# Patient Record
Sex: FEMALE | Race: WHITE | NOT HISPANIC OR LATINO | Employment: FULL TIME | URBAN - METROPOLITAN AREA
[De-identification: names, ages, dates, MRNs, and addresses within clinical notes are randomized per-mention and may not be internally consistent; named-entity substitution may affect disease eponyms.]

---

## 2020-01-06 PROBLEM — G70.00 MYASTHENIA GRAVIS: Status: ACTIVE | Noted: 2020-01-06

## 2020-05-15 PROBLEM — F31.9 BIPOLAR DISORDER: Status: ACTIVE | Noted: 2020-05-15

## 2020-05-22 ENCOUNTER — TELEPHONE (OUTPATIENT)
Dept: NEUROLOGY | Facility: CLINIC | Age: 35
End: 2020-05-22

## 2020-05-22 NOTE — TELEPHONE ENCOUNTER
----- Message from Nely Cifuentes sent at 5/22/2020 11:45 AM CDT -----  Contact: self @ [phone]  Calling to schedule a NP appt for Myasthenia Gravis but there is nothing available.  Pls call.

## 2024-05-01 ENCOUNTER — LAB REQUISITION (OUTPATIENT)
Dept: LAB | Facility: HOSPITAL | Age: 39
End: 2024-05-01
Payer: COMMERCIAL

## 2024-05-01 DIAGNOSIS — R11.2 NAUSEA WITH VOMITING, UNSPECIFIED: ICD-10-CM

## 2024-05-01 PROCEDURE — 87086 URINE CULTURE/COLONY COUNT: CPT

## 2024-05-03 LAB — BACTERIA UR CULT: NORMAL

## 2024-05-17 ENCOUNTER — OFFICE VISIT (OUTPATIENT)
Dept: PRIMARY CARE | Facility: CLINIC | Age: 39
End: 2024-05-17
Payer: COMMERCIAL

## 2024-05-17 ENCOUNTER — LAB (OUTPATIENT)
Dept: LAB | Facility: LAB | Age: 39
End: 2024-05-17
Payer: COMMERCIAL

## 2024-05-17 VITALS
WEIGHT: 128.4 LBS | RESPIRATION RATE: 16 BRPM | HEIGHT: 65 IN | DIASTOLIC BLOOD PRESSURE: 84 MMHG | SYSTOLIC BLOOD PRESSURE: 120 MMHG | BODY MASS INDEX: 21.39 KG/M2 | HEART RATE: 80 BPM

## 2024-05-17 DIAGNOSIS — Z00.00 ROUTINE ADULT HEALTH MAINTENANCE: ICD-10-CM

## 2024-05-17 DIAGNOSIS — R63.4 WEIGHT LOSS: ICD-10-CM

## 2024-05-17 DIAGNOSIS — Z51.81 MEDICATION MONITORING ENCOUNTER: ICD-10-CM

## 2024-05-17 DIAGNOSIS — G70.00 MYASTHENIA GRAVIS (MULTI): ICD-10-CM

## 2024-05-17 DIAGNOSIS — K21.9 GASTROESOPHAGEAL REFLUX DISEASE, UNSPECIFIED WHETHER ESOPHAGITIS PRESENT: ICD-10-CM

## 2024-05-17 DIAGNOSIS — E55.9 VITAMIN D DEFICIENCY: ICD-10-CM

## 2024-05-17 DIAGNOSIS — F31.9 BIPOLAR AFFECTIVE DISORDER, REMISSION STATUS UNSPECIFIED (MULTI): Primary | ICD-10-CM

## 2024-05-17 DIAGNOSIS — H54.7 VISION LOSS: ICD-10-CM

## 2024-05-17 DIAGNOSIS — F51.01 PRIMARY INSOMNIA: ICD-10-CM

## 2024-05-17 LAB
25(OH)D3 SERPL-MCNC: 27 NG/ML (ref 30–100)
ALBUMIN SERPL BCP-MCNC: 4.7 G/DL (ref 3.4–5)
ALP SERPL-CCNC: 58 U/L (ref 33–110)
ALT SERPL W P-5'-P-CCNC: 23 U/L (ref 7–45)
ANION GAP SERPL CALC-SCNC: 14 MMOL/L (ref 10–20)
AST SERPL W P-5'-P-CCNC: 25 U/L (ref 9–39)
BASOPHILS # BLD AUTO: 0.06 X10*3/UL (ref 0–0.1)
BASOPHILS NFR BLD AUTO: 0.8 %
BILIRUB SERPL-MCNC: 0.4 MG/DL (ref 0–1.2)
BUN SERPL-MCNC: 11 MG/DL (ref 6–23)
CALCIUM SERPL-MCNC: 10 MG/DL (ref 8.6–10.6)
CHLORIDE SERPL-SCNC: 98 MMOL/L (ref 98–107)
CHOLEST SERPL-MCNC: 256 MG/DL (ref 0–199)
CHOLESTEROL/HDL RATIO: 2.9
CO2 SERPL-SCNC: 29 MMOL/L (ref 21–32)
CREAT SERPL-MCNC: 0.77 MG/DL (ref 0.5–1.05)
EGFRCR SERPLBLD CKD-EPI 2021: >90 ML/MIN/1.73M*2
EOSINOPHIL # BLD AUTO: 0.06 X10*3/UL (ref 0–0.7)
EOSINOPHIL NFR BLD AUTO: 0.8 %
ERYTHROCYTE [DISTWIDTH] IN BLOOD BY AUTOMATED COUNT: 12.6 % (ref 11.5–14.5)
GLUCOSE SERPL-MCNC: 99 MG/DL (ref 74–99)
HCT VFR BLD AUTO: 38.3 % (ref 36–46)
HDLC SERPL-MCNC: 87.1 MG/DL
HGB BLD-MCNC: 12.3 G/DL (ref 12–16)
IMM GRANULOCYTES # BLD AUTO: 0.02 X10*3/UL (ref 0–0.7)
IMM GRANULOCYTES NFR BLD AUTO: 0.3 % (ref 0–0.9)
LDLC SERPL CALC-MCNC: 155 MG/DL
LYMPHOCYTES # BLD AUTO: 1.74 X10*3/UL (ref 1.2–4.8)
LYMPHOCYTES NFR BLD AUTO: 24.3 %
MCH RBC QN AUTO: 31.2 PG (ref 26–34)
MCHC RBC AUTO-ENTMCNC: 32.1 G/DL (ref 32–36)
MCV RBC AUTO: 97 FL (ref 80–100)
MONOCYTES # BLD AUTO: 0.9 X10*3/UL (ref 0.1–1)
MONOCYTES NFR BLD AUTO: 12.6 %
NEUTROPHILS # BLD AUTO: 4.38 X10*3/UL (ref 1.2–7.7)
NEUTROPHILS NFR BLD AUTO: 61.2 %
NON HDL CHOLESTEROL: 169 MG/DL (ref 0–149)
NRBC BLD-RTO: 0 /100 WBCS (ref 0–0)
PLATELET # BLD AUTO: 273 X10*3/UL (ref 150–450)
POTASSIUM SERPL-SCNC: 4.9 MMOL/L (ref 3.5–5.3)
PROT SERPL-MCNC: 7.5 G/DL (ref 6.4–8.2)
RBC # BLD AUTO: 3.94 X10*6/UL (ref 4–5.2)
SODIUM SERPL-SCNC: 136 MMOL/L (ref 136–145)
TRIGL SERPL-MCNC: 72 MG/DL (ref 0–149)
TSH SERPL-ACNC: 2.19 MIU/L (ref 0.44–3.98)
VLDL: 14 MG/DL (ref 0–40)
WBC # BLD AUTO: 7.2 X10*3/UL (ref 4.4–11.3)

## 2024-05-17 PROCEDURE — 85025 COMPLETE CBC W/AUTO DIFF WBC: CPT

## 2024-05-17 PROCEDURE — 82306 VITAMIN D 25 HYDROXY: CPT

## 2024-05-17 PROCEDURE — 36415 COLL VENOUS BLD VENIPUNCTURE: CPT

## 2024-05-17 PROCEDURE — 80053 COMPREHEN METABOLIC PANEL: CPT

## 2024-05-17 PROCEDURE — 99205 OFFICE O/P NEW HI 60 MIN: CPT | Performed by: INTERNAL MEDICINE

## 2024-05-17 PROCEDURE — 84443 ASSAY THYROID STIM HORMONE: CPT

## 2024-05-17 PROCEDURE — 80061 LIPID PANEL: CPT

## 2024-05-17 RX ORDER — PYRIDOSTIGMINE BROMIDE 60 MG/1
60 TABLET ORAL
COMMUNITY

## 2024-05-17 RX ORDER — ZOLPIDEM TARTRATE 10 MG/1
5 TABLET ORAL NIGHTLY PRN
COMMUNITY
End: 2024-05-23 | Stop reason: ALTCHOICE

## 2024-05-17 RX ORDER — CARBAMAZEPINE 200 MG/1
200 TABLET, EXTENDED RELEASE ORAL 2 TIMES DAILY
COMMUNITY

## 2024-05-17 RX ORDER — FOLIC ACID 1 MG/1
TABLET ORAL DAILY
COMMUNITY

## 2024-05-17 RX ORDER — FAMOTIDINE 20 MG/1
20 TABLET, FILM COATED ORAL 2 TIMES DAILY
Qty: 60 TABLET | Refills: 2 | Status: SHIPPED | OUTPATIENT
Start: 2024-05-17

## 2024-05-17 RX ORDER — METHOTREXATE 2.5 MG/1
TABLET ORAL
COMMUNITY

## 2024-05-17 RX ORDER — TRAZODONE HYDROCHLORIDE 50 MG/1
50 TABLET ORAL NIGHTLY PRN
Qty: 30 TABLET | Refills: 2 | Status: SHIPPED | OUTPATIENT
Start: 2024-05-17 | End: 2025-05-17

## 2024-05-17 ASSESSMENT — PATIENT HEALTH QUESTIONNAIRE - PHQ9
2. FEELING DOWN, DEPRESSED OR HOPELESS: SEVERAL DAYS
SUM OF ALL RESPONSES TO PHQ9 QUESTIONS 1 AND 2: 2
1. LITTLE INTEREST OR PLEASURE IN DOING THINGS: SEVERAL DAYS

## 2024-05-17 ASSESSMENT — PAIN SCALES - GENERAL: PAINLEVEL: 0-NO PAIN

## 2024-05-17 NOTE — PROGRESS NOTES
"Subjective   Azeb Amato is a 39 y.o. female who presents for Establish Care (Discuss referrals for Neurologist and Therapist.).  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 [place] zolpidem: has 10 mg ant takes 5 mg     Seh goes back to [place] every 5 mo to see her neurologist    Former smoker : began at 13 y/o to 2013. Was less than pack a day  She will have some wine on the weekends    Review of Systems   HENT:  Negative for hearing loss.    Respiratory:  Negative for shortness of breath.    Cardiovascular:  Negative for chest pain.   Gastrointestinal:  Positive for abdominal pain. Negative for constipation and diarrhea.        She has been having more dyspepsia and reflux.   She has also been having pelvic pain; she thinks it may related to her IUD.        Objective   /84   Pulse 80   Resp 16   Ht 1.651 m (5' 5\")   Wt 58.2 kg (128 lb 6.4 oz)   BMI 21.37 kg/m²    Physical Exam  Constitutional:       Appearance: Normal appearance.   HENT:      Right Ear: Tympanic membrane, ear canal and external ear normal.      Left Ear: Tympanic membrane, ear canal and external ear normal.      Mouth/Throat:      Mouth: Mucous membranes are moist.      Pharynx: Oropharynx is clear.   Eyes:      Conjunctiva/sclera: Conjunctivae normal.      Pupils: " "Pupils are equal, round, and reactive to light.      Comments: Strabisumus noted.    Cardiovascular:      Rate and Rhythm: Normal rate and regular rhythm.   Pulmonary:      Effort: Pulmonary effort is normal.      Breath sounds: Normal breath sounds.   Abdominal:      General: Bowel sounds are normal. There is no distension.      Palpations: Abdomen is soft. There is no mass.      Tenderness: There is no abdominal tenderness.   Lymphadenopathy:      Cervical: No cervical adenopathy.   Skin:     General: Skin is warm and dry.   Neurological:      General: No focal deficit present.      Mental Status: Mental status is at baseline.         Assessment/Plan   Problem List Items Addressed This Visit    None    1.myasthenia gravis:  refer to neurology and also ophthlamology.  She is staking pyrodistigmine  and methotrexate.   2. Bipolar disorder: currently she is on carbamazepine. She expresses concern over her moods right now, especially since she's having trouble sleeping.  I referred her to psych through . Prescribed trazodone for her , 50 to 100 mg at night. She has been taking a zolpiem from [place] as needed.   3. Inosominia related to bipolar disorder: rx trazodone.   4. GERD/dyspepsia\" rx for famotidine 20 mg twice daily.   5. Pelvic pain: rx for ibuprofen to use sparingly , as can have some interaction with Methotrexate  6. Weight loss:  will check cmp, cbc, tsh  7. Visual loss: she has been found to have decreased vision due to strabismus from myasthnia gravis. Refer to ophtho.   See me in 6 to  8 weeks  Check labs       "

## 2024-05-17 NOTE — PATIENT INSTRUCTIONS
It was nice to meet you today.    Take pepcid 20 mg one twice a day as needed for heartburn/stomach upset.     Trazodone for sleep 50 mg at night, once you are relaxed.   Can try taking one half at night  at first.   NO electronics at bedtime.     Get blood test done after fasting overnight.  The  lab is on the first floor.  Lab hours are 7 am to 4 pm Mon-Fri and 8am to Noon on Saturday.  No appt is needed.  Orders are entered into the system so you do not need a paper order.     See me again in 6 to 8 weeks for 30 min visit.

## 2024-05-22 ASSESSMENT — ENCOUNTER SYMPTOMS
ABDOMINAL PAIN: 1
FREQUENCY: 1
CONSTIPATION: 1
BELCHING: 1
WEIGHT LOSS: 1
ANOREXIA: 1
NAUSEA: 1

## 2024-05-23 ENCOUNTER — OFFICE VISIT (OUTPATIENT)
Dept: PRIMARY CARE | Facility: CLINIC | Age: 39
End: 2024-05-23
Payer: COMMERCIAL

## 2024-05-23 VITALS
HEIGHT: 65 IN | RESPIRATION RATE: 16 BRPM | BODY MASS INDEX: 21.56 KG/M2 | WEIGHT: 129.4 LBS | SYSTOLIC BLOOD PRESSURE: 128 MMHG | DIASTOLIC BLOOD PRESSURE: 80 MMHG | HEART RATE: 78 BPM

## 2024-05-23 DIAGNOSIS — R10.2 PELVIC PAIN: Primary | ICD-10-CM

## 2024-05-23 DIAGNOSIS — F51.01 PRIMARY INSOMNIA: ICD-10-CM

## 2024-05-23 PROCEDURE — 99214 OFFICE O/P EST MOD 30 MIN: CPT | Performed by: INTERNAL MEDICINE

## 2024-05-23 RX ORDER — IBUPROFEN 600 MG/1
600 TABLET ORAL 4 TIMES DAILY PRN
Qty: 60 TABLET | Refills: 1 | Status: SHIPPED | OUTPATIENT
Start: 2024-05-23

## 2024-05-23 RX ORDER — ZOLPIDEM TARTRATE 5 MG/1
5 TABLET ORAL NIGHTLY PRN
Qty: 30 TABLET | Refills: 0 | Status: SHIPPED | OUTPATIENT
Start: 2024-05-23

## 2024-05-23 ASSESSMENT — PATIENT HEALTH QUESTIONNAIRE - PHQ9
2. FEELING DOWN, DEPRESSED OR HOPELESS: NOT AT ALL
1. LITTLE INTEREST OR PLEASURE IN DOING THINGS: NOT AT ALL
SUM OF ALL RESPONSES TO PHQ9 QUESTIONS 1 AND 2: 0

## 2024-05-23 ASSESSMENT — PAIN SCALES - GENERAL: PAINLEVEL: 0-NO PAIN

## 2024-05-23 NOTE — PATIENT INSTRUCTIONS
I sent over ibuprofen ; see how you do with taking it with food.   Is not for every single day, due to your methotrexate, but it can help.     I sent in for zolpidem ( sleeping pill ) .  Is 5 mg so you don't need to split anything.   Get rid of trazodone.     I recommend vitamin D 3 2,000 units once a day as your vitamin D was low.     Keep your next appt with me.

## 2024-05-23 NOTE — PROGRESS NOTES
Subjective   Azeb Amato is a 39 y.o. female who presents for Follow-up (Pt here for ER follow up for nausea and vomiting, possibly due to IUD.).    She went to ER twice  She thought she had a panic attack that night  Had chest pain    She had not been having usual bm for a few days  Seh has miralax    On Monday she had woke up with vomiting and lower abd pain   She said she was passing out from pain   She had much looser stool with some cramping  She has an IUD and wonders if this could be causing her abdominal pain  Her urinalysis was negative  She said she was referrred to gyn and she has an appt scheduled for a [facility] gyn    She has been having more problems with sleeping  She does not want to continue on trazodone as she had really vivid dreams   She endorses that zolpidem has worked consistently for her    OARRS:  Tashia Nobles, DO on 5/23/2024  4:04 PM  I have personally reviewed the OARRS report for Azeb Amato. I have considered the risks of abuse, dependence, addiction and diversion and I believe that it is clinically appropriate for Azeb Amato to be prescribed this medication    Is the patient prescribed a combination of a benzodiazepine and opioid?  No    Last Urine Drug Screen / ordered today: No  No results found for this or any previous visit (from the past 8760 hour(s)).  N/A    Clinical rationale for not completing a Urine Drug Screen: will order at next visit      Controlled Substance Agreement:  Date of the Last Agreement: 5/23/2024  Reviewed Controlled Substance Agreement including but not limited to the benefits, risks, and alternatives to treatment with a Controlled Substance medication(s).    Sleep Aids:   What is the patient's goal of therapy? Treatment of insomnia  Is this being achieved with current treatment? Yes.     Activities of Daily Living:   Is your overall impression that this patient is benefiting (symptom reduction outweighs side effects) from sleep aid therapy? I believe she  "will benefit from it. She is also diagnosed with bipolar disorder and is having more difficulty with sleep . She is scheduled to see psychiatry .   She does not have any personal history of substance abuse.       Review of Systems    Objective   /80   Pulse 78   Resp 16   Ht 1.651 m (5' 5\")   Wt 58.7 kg (129 lb 6.4 oz)   BMI 21.53 kg/m²    Physical Exam  Visit Vitals  /80   Pulse 78   Resp 16   Ht 1.651 m (5' 5\")   Wt 58.7 kg (129 lb 6.4 oz)   BMI 21.53 kg/m²   Smoking Status Former   BSA 1.64 m²      GEN: NAD  HEENT: normal  NECK: no adenopathy, no thyroid enlargment  LUNGS: CTAB  CV: reg S1/S2 no murmurs  ABD: has some lower abdominal discomfort to palpation, no mass or organomegaly  EXT: no leg edema   Assessment/Plan   Problem List Items Addressed This Visit    None  Visit Diagnoses       Pelvic pain    -  RX for ibuprofen 600 up to 4 times a day as needed for her abdominal /pelvic cramping. I feel it may be related to her IUD as well. She is going to see OB.     Relevant Medications    ibuprofen 600 mg tablet    Primary insomnia    controlled substance agreement completed today. Will order urine drug screening if she continues on zolpidem. Rx sent for #30 tabs / NR.    Relevant Medications    zolpidem (Ambien) 5 mg tablet               "

## 2024-06-02 ASSESSMENT — ENCOUNTER SYMPTOMS
CONSTIPATION: 0
ABDOMINAL PAIN: 1
DIARRHEA: 0
SHORTNESS OF BREATH: 0

## 2024-06-28 ENCOUNTER — APPOINTMENT (OUTPATIENT)
Dept: BEHAVIORAL HEALTH | Facility: CLINIC | Age: 39
End: 2024-06-28
Payer: COMMERCIAL

## 2024-07-02 ENCOUNTER — APPOINTMENT (OUTPATIENT)
Dept: PRIMARY CARE | Facility: CLINIC | Age: 39
End: 2024-07-02
Payer: COMMERCIAL

## 2024-07-02 VITALS
HEIGHT: 65 IN | BODY MASS INDEX: 22.13 KG/M2 | SYSTOLIC BLOOD PRESSURE: 132 MMHG | HEART RATE: 86 BPM | RESPIRATION RATE: 16 BRPM | DIASTOLIC BLOOD PRESSURE: 69 MMHG | WEIGHT: 132.8 LBS

## 2024-07-02 DIAGNOSIS — R11.2 NAUSEA AND VOMITING, UNSPECIFIED VOMITING TYPE: Primary | ICD-10-CM

## 2024-07-02 DIAGNOSIS — F31.9 BIPOLAR AFFECTIVE DISORDER, REMISSION STATUS UNSPECIFIED (MULTI): ICD-10-CM

## 2024-07-02 DIAGNOSIS — G70.00 MYASTHENIA GRAVIS (MULTI): ICD-10-CM

## 2024-07-02 PROCEDURE — 1036F TOBACCO NON-USER: CPT | Performed by: INTERNAL MEDICINE

## 2024-07-02 PROCEDURE — 99214 OFFICE O/P EST MOD 30 MIN: CPT | Performed by: INTERNAL MEDICINE

## 2024-07-02 RX ORDER — ONDANSETRON 4 MG/1
4 TABLET, ORALLY DISINTEGRATING ORAL EVERY 8 HOURS PRN
Qty: 30 TABLET | Refills: 2 | Status: SHIPPED | OUTPATIENT
Start: 2024-07-02

## 2024-07-02 ASSESSMENT — PAIN SCALES - GENERAL: PAINLEVEL: 0-NO PAIN

## 2024-07-02 ASSESSMENT — PATIENT HEALTH QUESTIONNAIRE - PHQ9
2. FEELING DOWN, DEPRESSED OR HOPELESS: NOT AT ALL
SUM OF ALL RESPONSES TO PHQ9 QUESTIONS 1 AND 2: 0
1. LITTLE INTEREST OR PLEASURE IN DOING THINGS: NOT AT ALL

## 2024-07-02 NOTE — PROGRESS NOTES
"Subjective   Azeb Amato is a 39 y.o. female who presents for Follow-up.    She saw Dr Corwin Retana from [facility] and she liked him a lot.     She is filing for divorce  Her 's behavior is worsening; he is using THC every day and he drinks excessively on weekend  He has been acting in a threatening way towards her, he is very explosive, calling her names  She is becoming more concerned because her daughter is hearing all of this, and her daughter has been quite distressed at this lately. She does have a plan of where to go.     She is seeing psych at [facility]  She is taking olanzapine 15 [facility] at [facility]  Her sleep is better, is not getting up for anything   She is working at home at night once she goes to sleep     She heas severe nausea in the morning  Some of it is from carbamazepine that she takes first thing in the morning    She did see physical therapy for her lower back pain and pelvic pain  She mentions that some of her urinary urgency and frequency is from drinking too much fluids  She says that she does not have time to stop and eat at work , so she has been using shakes, etc for liquid calories    She has a meeting at her work to discuss modifications for her, with an industrial psychologist.     She is still working on her [place] Citizenship, thinks she will need an  with the divorce.   Review of Systems    Objective   /69   Pulse 86   Resp 16   Ht 1.651 m (5' 5\")   Wt 60.2 kg (132 lb 12.8 oz)   BMI 22.10 kg/m²    Physical Exam  Visit Vitals  /69   Pulse 86   Resp 16   Ht 1.651 m (5' 5\")   Wt 60.2 kg (132 lb 12.8 oz)   BMI 22.10 kg/m²   Smoking Status Former   BSA 1.66 m²      GEN: NAD  HEENT: normal  NECK: no adenopathy, no thyroid enlargment  LUNGS: CTAB  CV: reg S1/S2 no murmurs  EXT: no leg edema   Assessment/Plan   Problem List Items Addressed This Visit    None  Visit Diagnoses       Nausea and vomiting:  likely related to anxiety and med side effects. " Trial zofran ODT, recommend one every morning with onset nausea.    Relevant Medications    ondansetron ODT (Zofran-ODT) 4 mg disintegrating tablet. RX #30 tabs  2 RF    Bipolar affective disorder, remission status unspecified (Multi)    it seems she is coping with everything as well as she could. She is taking carbamazepine , and olanzapine 50 at night.        Myasthenia gravis (Multi)    liked [facility] neuro.  She is still going back to [place] to see her primary neurologist. She is on methotrexate, pyridostigmine and folate.     See me again in 3 months

## 2024-08-16 ENCOUNTER — APPOINTMENT (OUTPATIENT)
Dept: BEHAVIORAL HEALTH | Facility: CLINIC | Age: 39
End: 2024-08-16
Payer: COMMERCIAL

## 2024-09-26 ENCOUNTER — APPOINTMENT (OUTPATIENT)
Dept: OPHTHALMOLOGY | Facility: CLINIC | Age: 39
End: 2024-09-26
Payer: COMMERCIAL

## 2024-09-27 ENCOUNTER — TELEPHONE (OUTPATIENT)
Dept: PRIMARY CARE | Facility: CLINIC | Age: 39
End: 2024-09-27
Payer: COMMERCIAL

## 2024-09-27 DIAGNOSIS — J20.8 ACUTE BRONCHITIS DUE TO OTHER SPECIFIED ORGANISMS: Primary | ICD-10-CM

## 2024-09-27 RX ORDER — DOXYCYCLINE 100 MG/1
100 CAPSULE ORAL 2 TIMES DAILY
Qty: 14 CAPSULE | Refills: 0 | Status: SHIPPED | OUTPATIENT
Start: 2024-09-27 | End: 2024-10-04

## 2024-09-27 NOTE — TELEPHONE ENCOUNTER
Patient called stating she think she has a cold and it feels like it is in her chest.  Patient want a call from the nurse    Azeb [phone]

## 2024-10-14 ENCOUNTER — APPOINTMENT (OUTPATIENT)
Dept: PRIMARY CARE | Facility: CLINIC | Age: 39
End: 2024-10-14
Payer: COMMERCIAL

## 2024-10-14 VITALS
HEART RATE: 86 BPM | BODY MASS INDEX: 22.82 KG/M2 | RESPIRATION RATE: 16 BRPM | DIASTOLIC BLOOD PRESSURE: 69 MMHG | SYSTOLIC BLOOD PRESSURE: 105 MMHG | WEIGHT: 137 LBS | HEIGHT: 65 IN

## 2024-10-14 DIAGNOSIS — E55.9 VITAMIN D DEFICIENCY: ICD-10-CM

## 2024-10-14 DIAGNOSIS — F51.05 INSOMNIA DUE TO OTHER MENTAL DISORDER: ICD-10-CM

## 2024-10-14 DIAGNOSIS — Z97.5 BREAKTHROUGH BLEEDING ASSOCIATED WITH INTRAUTERINE DEVICE (IUD): Primary | ICD-10-CM

## 2024-10-14 DIAGNOSIS — G70.00 MYASTHENIA GRAVIS: ICD-10-CM

## 2024-10-14 DIAGNOSIS — Z51.81 MEDICATION MONITORING ENCOUNTER: ICD-10-CM

## 2024-10-14 DIAGNOSIS — F99 INSOMNIA DUE TO OTHER MENTAL DISORDER: ICD-10-CM

## 2024-10-14 DIAGNOSIS — Z00.00 ROUTINE ADULT HEALTH MAINTENANCE: ICD-10-CM

## 2024-10-14 DIAGNOSIS — F31.9 BIPOLAR 1 DISORDER (MULTI): ICD-10-CM

## 2024-10-14 DIAGNOSIS — R73.09 ELEVATED GLUCOSE: ICD-10-CM

## 2024-10-14 DIAGNOSIS — N92.1 BREAKTHROUGH BLEEDING ASSOCIATED WITH INTRAUTERINE DEVICE (IUD): Primary | ICD-10-CM

## 2024-10-14 PROCEDURE — 3008F BODY MASS INDEX DOCD: CPT | Performed by: INTERNAL MEDICINE

## 2024-10-14 PROCEDURE — 99214 OFFICE O/P EST MOD 30 MIN: CPT | Performed by: INTERNAL MEDICINE

## 2024-10-14 RX ORDER — METHOTREXATE 2.5 MG/1
15 TABLET ORAL
Qty: 15 TABLET | Refills: 0 | Status: SHIPPED | OUTPATIENT
Start: 2024-10-14

## 2024-10-14 RX ORDER — CARBAMAZEPINE 200 MG/1
400 TABLET, EXTENDED RELEASE ORAL 2 TIMES DAILY
Qty: 120 TABLET | Refills: 0 | Status: SHIPPED | OUTPATIENT
Start: 2024-10-14

## 2024-10-14 ASSESSMENT — PAIN SCALES - GENERAL: PAINLEVEL: 0-NO PAIN

## 2024-10-14 NOTE — PATIENT INSTRUCTIONS
I recommend a daily B Complex vitamin daily.  Remain on vitamin D.    Schedule with Dr Goncalves / [facility] for gyn.    See me again in 6 months for 30 min visit .    Close to next visit : Get blood test done after fasting overnight.  The  lab is on the first floor.  Lab hours are 7 am to 4 pm Mon-Fri and 8am to Noon on Saturday.  No appt is needed.  Orders are entered into the system so you do not need a paper order.

## 2024-10-14 NOTE — PROGRESS NOTES
Subjective   Patient ID: Azeb Amato is a 39 y.o. female who presents for Follow-up (Pt here for 3 month follow up.).    HPI   Doing much better than 3 mo ago   She and her daughter are safe in their own apartment  She says that her soon to be ex seems to have stopped drinking for the time being  Work is calmed down as well    She has been sleeping better  She has been having much less frequent panic attacks    She is seeing chiro for her back which helps, sees twice a week     She recovered from her bronchitis , the med I called in helped her   She is taking methotrexate 15 mg on Fri: still feels a bit ill after it but is recoveing ok  Starts in afternoon on Monday  She is hoping to be able to stop methotrexate if possible.     She is taking mestionon 6 times a day     She is on olanzapine 10 mg , it helps with her   She just started Topamax to help counteract the increased appetitive with olnazapine    She is spotting with IUD and has been having some cramping  She had never had spotting before  Got IUD in 2022     She is taking 2000 units vit D daily   Review of Systems      Current Outpatient Medications:     carBAMazepine XR (TEGretol  XR) 200 mg 12 hr tablet, Take 1 tablet (200 mg) by mouth 2 times a day. Do not crush, chew, or split., Disp: , Rfl:     famotidine (Pepcid) 20 mg tablet, Take 1 tablet (20 mg) by mouth 2 times a day. For Acid reflux/heartburn., Disp: 60 tablet, Rfl: 2    folic acid (Folvite) 1 mg tablet, Take by mouth once daily., Disp: , Rfl:     levonorgestrel (Mirena) 21 mcg/24 hours (8 yrs) 52 mg IUD, 52 mg by intrauterine route 1 time., Disp: , Rfl:     methotrexate (Trexall) 2.5 mg tablet, Take by mouth 1 (one) time per week.  Follow directions carefully, and ask to explain any part you do not understand. Take exactly as directed., Disp: , Rfl:     ondansetron ODT (Zofran-ODT) 4 mg disintegrating tablet, Take 1 tablet (4 mg) by mouth every 8 hours if needed for nausea or vomiting., Disp:  "30 tablet, Rfl: 2    pyridostigmine (Mestinon) 60 mg tablet, Take 1 tablet (60 mg) by mouth 6 times a day., Disp: , Rfl:     traZODone (Desyrel) 50 mg tablet, Take 1 tablet (50 mg) by mouth as needed at bedtime for sleep., Disp: 30 tablet, Rfl: 2    Objective   /69   Pulse 86   Resp 16   Ht 1.651 m (5' 5\")   Wt 62.1 kg (137 lb)   BMI 22.80 kg/m²     Physical Exam  Constitutional:       Appearance: Normal appearance.   HENT:      Mouth/Throat:      Mouth: Mucous membranes are moist.      Pharynx: Oropharynx is clear.   Eyes:      Conjunctiva/sclera: Conjunctivae normal.      Pupils: Pupils are equal, round, and reactive to light.   Cardiovascular:      Rate and Rhythm: Normal rate and regular rhythm.      Heart sounds: Normal heart sounds.   Pulmonary:      Effort: Pulmonary effort is normal.      Breath sounds: Normal breath sounds.   Abdominal:      General: Bowel sounds are normal. There is no distension.      Palpations: Abdomen is soft. There is no mass.      Tenderness: There is no abdominal tenderness.   Lymphadenopathy:      Cervical: No cervical adenopathy.   Skin:     General: Skin is warm and dry.   Neurological:      General: No focal deficit present.         Assessment/Plan   Problem List Items Addressed This Visit       Bipolar 1 disorder (Multi) remain on carbamazepine 400 twice a day, and also olanzapine 10 at night. Since things have settled down with her , she is doing better mentally.     Myasthenia gravis remain on pyridostigmine and also methotrexate. Follows with neuro.     Relevant Orders    TSH with reflex to Free T4 if abnormal     Other Visit Diagnoses       Breakthrough bleeding associated with intrauterine device (IUD)    -  Primary    Relevant Orders    Referral to Gynecology    Medication monitoring encounter        Relevant Orders    Comprehensive Metabolic Panel    TSH with reflex to Free T4 if abnormal    CBC and Auto Differential    Elevated glucose        Relevant Orders "    Comprehensive Metabolic Panel    TSH with reflex to Free T4 if abnormal    Hemoglobin A1C

## 2024-10-17 ENCOUNTER — APPOINTMENT (OUTPATIENT)
Dept: OPHTHALMOLOGY | Facility: CLINIC | Age: 39
End: 2024-10-17
Payer: COMMERCIAL

## 2024-10-17 DIAGNOSIS — H17.9 RIGHT CORNEAL SCAR: Primary | ICD-10-CM

## 2024-10-17 DIAGNOSIS — Z01.00 EMMETROPIA: ICD-10-CM

## 2024-10-17 DIAGNOSIS — H54.7 VISION LOSS: ICD-10-CM

## 2024-10-17 PROCEDURE — 92004 COMPRE OPH EXAM NEW PT 1/>: CPT | Performed by: OPTOMETRIST

## 2024-10-17 PROCEDURE — 92025 CPTRIZED CORNEAL TOPOGRAPHY: CPT | Performed by: OPTOMETRIST

## 2024-10-17 ASSESSMENT — REFRACTION_MANIFEST
OS_SPHERE: +0.00
OD_SPHERE: BALANCE

## 2024-10-17 ASSESSMENT — CONF VISUAL FIELD
OS_SUPERIOR_TEMPORAL_RESTRICTION: 0
OD_SUPERIOR_TEMPORAL_RESTRICTION: 0
OD_NORMAL: 1
OD_SUPERIOR_NASAL_RESTRICTION: 0
OS_SUPERIOR_NASAL_RESTRICTION: 0
OD_INFERIOR_NASAL_RESTRICTION: 0
OD_INFERIOR_TEMPORAL_RESTRICTION: 0
OS_INFERIOR_TEMPORAL_RESTRICTION: 0
OS_INFERIOR_NASAL_RESTRICTION: 0
OS_NORMAL: 1

## 2024-10-17 ASSESSMENT — EXTERNAL EXAM - RIGHT EYE: OD_EXAM: NORMAL

## 2024-10-17 ASSESSMENT — VISUAL ACUITY
OS_SC: 20/20
METHOD: SNELLEN - LINEAR
OD_SC: HM

## 2024-10-17 ASSESSMENT — ENCOUNTER SYMPTOMS: EYES NEGATIVE: 1

## 2024-10-17 ASSESSMENT — SLIT LAMP EXAM - LIDS
COMMENTS: NORMAL
COMMENTS: NORMAL

## 2024-10-17 ASSESSMENT — TONOMETRY
IOP_METHOD: GOLDMANN APPLANATION
OD_IOP_MMHG: 14
OS_IOP_MMHG: 14

## 2024-10-17 ASSESSMENT — EXTERNAL EXAM - LEFT EYE: OS_EXAM: NORMAL

## 2024-10-17 NOTE — PROGRESS NOTES
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 Was offered penetrating keratoplasty (PKP). Was seen for this elsewhere and is interested in proceeding.     Has photophobia and reflex tearing stable and has managed. Has not has any keratitis flares. Has not had valcyclovir or acyclovir maintenance therapy after initial therapy for acute phase. No drops for this.     Does not have safety glasses. Has intermittent sensory XT. Patient feels this is visible and annoying.     The scar appears too dense for an rigid gas permeable (RGP) to help adequately with vision.     Pentacam topographical analysis of the cornea revealed:  OD:simulated keratometric values  irregular no reading  Kmax: 144.8  thinnest corneal pachymetry value no reading micron  posterior float deviation from normal no view micron  OS:simulated keratometric values  43.2/44.0  Kmax: 44.4  thinnest corneal pachymetry value 510 micron  posterior float deviation from normal  +4 micron  These values are indicative of corneal irregularity OD and likely contribute to reduced visual acuity.     Referred for penetrating keratoplasty (PKP) consideration to Dr Delbert Zaidi.

## 2024-10-27 RX ORDER — OLANZAPINE 10 MG/1
10 TABLET ORAL NIGHTLY
COMMUNITY
Start: 2024-10-06

## 2024-11-21 ENCOUNTER — APPOINTMENT (OUTPATIENT)
Dept: OPHTHALMOLOGY | Age: 39
End: 2024-11-21
Payer: COMMERCIAL

## 2024-11-25 ENCOUNTER — APPOINTMENT (OUTPATIENT)
Dept: OPHTHALMOLOGY | Age: 39
End: 2024-11-25
Payer: COMMERCIAL

## 2024-12-02 ENCOUNTER — APPOINTMENT (OUTPATIENT)
Dept: OPHTHALMOLOGY | Age: 39
End: 2024-12-02
Payer: COMMERCIAL

## 2024-12-02 ENCOUNTER — PREP FOR PROCEDURE (OUTPATIENT)
Dept: OPHTHALMOLOGY | Facility: HOSPITAL | Age: 39
End: 2024-12-02

## 2024-12-02 DIAGNOSIS — H17.9 CORNEAL SCAR AND OPACITY: Primary | ICD-10-CM

## 2024-12-02 DIAGNOSIS — H17.9 CORNEAL SCARRING: Primary | ICD-10-CM

## 2024-12-02 PROCEDURE — 99213 OFFICE O/P EST LOW 20 MIN: CPT | Performed by: OPHTHALMOLOGY

## 2024-12-02 RX ORDER — MOXIFLOXACIN 5 MG/ML
1 SOLUTION/ DROPS OPHTHALMIC 3 TIMES DAILY
OUTPATIENT
Start: 2024-12-02

## 2024-12-02 RX ORDER — TETRACAINE HYDROCHLORIDE 5 MG/ML
1 SOLUTION OPHTHALMIC ONCE
OUTPATIENT
Start: 2024-12-02 | End: 2024-12-02

## 2024-12-02 ASSESSMENT — VISUAL ACUITY
METHOD: SNELLEN - LINEAR
OS_SC: 20/20
OS_SC+: -2

## 2024-12-02 ASSESSMENT — CUP TO DISC RATIO: OS_RATIO: 0.2

## 2024-12-02 ASSESSMENT — EXTERNAL EXAM - LEFT EYE: OS_EXAM: NORMAL

## 2024-12-02 ASSESSMENT — ENCOUNTER SYMPTOMS: EYES NEGATIVE: 1

## 2024-12-02 ASSESSMENT — EXTERNAL EXAM - RIGHT EYE: OD_EXAM: NORMAL

## 2024-12-02 ASSESSMENT — TONOMETRY
OD_IOP_MMHG: 9
IOP_METHOD: GOLDMANN APPLANATION

## 2024-12-02 ASSESSMENT — SLIT LAMP EXAM - LIDS
COMMENTS: NORMAL
COMMENTS: NORMAL

## 2024-12-02 NOTE — H&P
History Of Present Illness  Azeb Amato is a 39 y.o. female presenting with blurred vision.     Past Medical History  She has no past medical history on file.    Surgical History  She has a past surgical history that includes Total thymectomy (); Cervical conization w/ laser; and  section, low transverse ().     Social History  She reports that she has quit smoking. Her smoking use included cigarettes. She has never used smokeless tobacco. She reports current alcohol use. She reports that she does not use drugs.     Allergies  Patient has no known allergies.    ROS  Patient denies ocular pain, redness, discharge, decreased vision, double vision, blind spots, flashes, or floaters.     Physical Exam  Not recorded          Assessment/Plan   Diagnoses and all orders for this visit:  Corneal scar and opacity  -     Case Request Operating Room: Keratoplasty Penetrating; Standing  Other orders  -     Place in outpatient/hospital ambulatory surgery; Standing  -     Full code; Standing  -     NPO Diet Except: Sips with meds; Effective now; Standing  -     Height and weight; Standing  -     Insert and maintain peripheral IV; Standing  -     Saline lock IV; Standing  -     POCT pregnancy, urine; Standing  -     Type And Screen; Standing  -     moxifloxacin (Vigamox) 0.5 % ophthalmic solution 1 drop  -     tetracaine (Altacaine) 0.5 % ophthalmic solution 1 drop    Blurred vision in the right eye, diagnosed with Corneal opacity/scarring.       I spent 30 minutes in the professional and overall care of this patient.      Erwin Zaidi MD

## 2024-12-02 NOTE — PROGRESS NOTES
Assessment/Plan   Diagnoses and all orders for this visit:  Corneal scarring    Patient had an infection, in 2011 while she was in [place]. Told it was herpetic, she had eye redness, pain and irritation when it happened. Didn't have other flare ups during the last 10 years.    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     Patient would like to proceed with penetrating keratoplasty (PK) in the right eye.    Explained all risks involved with a PK including but not limited to:  1. Risk of rejection   2. Need for steroid eye drops that may cause cataract/glaucoma  3. Need for corrective glasses/CLs postop  4. Consistent follow up visits  5. Need for more than one transplant in their lifetime  The patient understands and wishes to proceed     Plan:  Right penetrating keratoplasty (PK) under block (high risk for GA due to Mysmichael hodge).  Will cover with Valtrex few days before and after to prevent reactivation.  Patient consented.

## 2024-12-09 PROBLEM — H17.9 CORNEAL SCAR AND OPACITY: Status: ACTIVE | Noted: 2024-12-02

## 2025-01-15 RX ORDER — TOPIRAMATE 25 MG/1
25 TABLET ORAL NIGHTLY
COMMUNITY
Start: 2024-11-17 | End: 2025-03-11 | Stop reason: ALTCHOICE

## 2025-01-15 RX ORDER — LOPERAMIDE HYDROCHLORIDE 2 MG/1
1 CAPSULE ORAL EVERY 6 HOURS
COMMUNITY
Start: 2024-05-20 | End: 2025-03-11 | Stop reason: ALTCHOICE

## 2025-03-14 DIAGNOSIS — B00.52 HSV STROMAL KERATITIS: Primary | ICD-10-CM

## 2025-03-14 RX ORDER — VALACYCLOVIR HYDROCHLORIDE 500 MG/1
500 TABLET, FILM COATED ORAL 3 TIMES DAILY
Qty: 30 TABLET | Refills: 0 | Status: SHIPPED | OUTPATIENT
Start: 2025-03-14 | End: 2025-03-24

## 2025-03-17 ENCOUNTER — ANESTHESIA EVENT (OUTPATIENT)
Dept: OPERATING ROOM | Facility: CLINIC | Age: 40
End: 2025-03-17
Payer: COMMERCIAL

## 2025-03-17 RX ORDER — FENTANYL CITRATE 50 UG/ML
50 INJECTION, SOLUTION INTRAMUSCULAR; INTRAVENOUS EVERY 5 MIN PRN
Status: CANCELLED | OUTPATIENT
Start: 2025-03-17

## 2025-03-17 RX ORDER — FENTANYL CITRATE 50 UG/ML
25 INJECTION, SOLUTION INTRAMUSCULAR; INTRAVENOUS EVERY 5 MIN PRN
Status: CANCELLED | OUTPATIENT
Start: 2025-03-17

## 2025-03-17 RX ORDER — ONDANSETRON HYDROCHLORIDE 2 MG/ML
4 INJECTION, SOLUTION INTRAVENOUS ONCE AS NEEDED
Status: CANCELLED | OUTPATIENT
Start: 2025-03-17

## 2025-03-17 RX ORDER — HYDRALAZINE HYDROCHLORIDE 20 MG/ML
5 INJECTION INTRAMUSCULAR; INTRAVENOUS EVERY 30 MIN PRN
Status: CANCELLED | OUTPATIENT
Start: 2025-03-17

## 2025-03-17 RX ORDER — ALBUTEROL SULFATE 0.83 MG/ML
2.5 SOLUTION RESPIRATORY (INHALATION) ONCE AS NEEDED
Status: CANCELLED | OUTPATIENT
Start: 2025-03-17

## 2025-03-17 RX ORDER — ACETAMINOPHEN 325 MG/1
650 TABLET ORAL EVERY 4 HOURS PRN
Status: CANCELLED | OUTPATIENT
Start: 2025-03-17

## 2025-03-18 ENCOUNTER — HOSPITAL ENCOUNTER (OUTPATIENT)
Facility: CLINIC | Age: 40
Setting detail: OUTPATIENT SURGERY
Discharge: HOME | End: 2025-03-18
Attending: OPHTHALMOLOGY | Admitting: OPHTHALMOLOGY
Payer: COMMERCIAL

## 2025-03-18 ENCOUNTER — ANESTHESIA (OUTPATIENT)
Dept: OPERATING ROOM | Facility: CLINIC | Age: 40
End: 2025-03-18
Payer: COMMERCIAL

## 2025-03-18 VITALS
TEMPERATURE: 97.2 F | WEIGHT: 131.61 LBS | HEIGHT: 65 IN | HEART RATE: 75 BPM | DIASTOLIC BLOOD PRESSURE: 54 MMHG | BODY MASS INDEX: 21.93 KG/M2 | RESPIRATION RATE: 16 BRPM | SYSTOLIC BLOOD PRESSURE: 108 MMHG | OXYGEN SATURATION: 99 %

## 2025-03-18 DIAGNOSIS — H17.9 CORNEAL SCAR AND OPACITY: ICD-10-CM

## 2025-03-18 DIAGNOSIS — H17.9 RIGHT CORNEAL SCAR: Primary | ICD-10-CM

## 2025-03-18 LAB — PREGNANCY TEST URINE, POC: NEGATIVE

## 2025-03-18 PROCEDURE — 2720000007 HC OR 272 NO HCPCS: Performed by: OPHTHALMOLOGY

## 2025-03-18 PROCEDURE — 2500000004 HC RX 250 GENERAL PHARMACY W/ HCPCS (ALT 636 FOR OP/ED)

## 2025-03-18 PROCEDURE — 3600000008 HC OR TIME - EACH INCREMENTAL 1 MINUTE - PROCEDURE LEVEL THREE: Performed by: OPHTHALMOLOGY

## 2025-03-18 PROCEDURE — 3600000003 HC OR TIME - INITIAL BASE CHARGE - PROCEDURE LEVEL THREE: Performed by: OPHTHALMOLOGY

## 2025-03-18 PROCEDURE — 7100000009 HC PHASE TWO TIME - INITIAL BASE CHARGE: Performed by: OPHTHALMOLOGY

## 2025-03-18 PROCEDURE — 3700000002 HC GENERAL ANESTHESIA TIME - EACH INCREMENTAL 1 MINUTE: Performed by: OPHTHALMOLOGY

## 2025-03-18 PROCEDURE — 87102 FUNGUS ISOLATION CULTURE: CPT | Performed by: OPHTHALMOLOGY

## 2025-03-18 PROCEDURE — 2500000004 HC RX 250 GENERAL PHARMACY W/ HCPCS (ALT 636 FOR OP/ED): Performed by: OPHTHALMOLOGY

## 2025-03-18 PROCEDURE — 65730 CORNEAL TRANSPLANT: CPT | Performed by: OPHTHALMOLOGY

## 2025-03-18 PROCEDURE — 2780000003 HC OR 278 NO HCPCS: Performed by: OPHTHALMOLOGY

## 2025-03-18 PROCEDURE — 3700000001 HC GENERAL ANESTHESIA TIME - INITIAL BASE CHARGE: Performed by: OPHTHALMOLOGY

## 2025-03-18 PROCEDURE — 2500000005 HC RX 250 GENERAL PHARMACY W/O HCPCS: Performed by: OPHTHALMOLOGY

## 2025-03-18 PROCEDURE — V2785 CORNEAL TISSUE PROCESSING: HCPCS | Performed by: OPHTHALMOLOGY

## 2025-03-18 PROCEDURE — A65730 PR CORNEAL TRANSPLANT,PENETRATING

## 2025-03-18 PROCEDURE — 7100000010 HC PHASE TWO TIME - EACH INCREMENTAL 1 MINUTE: Performed by: OPHTHALMOLOGY

## 2025-03-18 PROCEDURE — 81025 URINE PREGNANCY TEST: CPT | Performed by: OPHTHALMOLOGY

## 2025-03-18 PROCEDURE — A65730 PR CORNEAL TRANSPLANT,PENETRATING: Performed by: ANESTHESIOLOGY

## 2025-03-18 PROCEDURE — 87075 CULTR BACTERIA EXCEPT BLOOD: CPT | Performed by: OPHTHALMOLOGY

## 2025-03-18 DEVICE — IMPLANTABLE DEVICE: Type: IMPLANTABLE DEVICE | Site: EYE | Status: FUNCTIONAL

## 2025-03-18 RX ORDER — TETRACAINE HYDROCHLORIDE 5 MG/ML
SOLUTION OPHTHALMIC AS NEEDED
Status: DISCONTINUED | OUTPATIENT
Start: 2025-03-18 | End: 2025-03-18 | Stop reason: HOSPADM

## 2025-03-18 RX ORDER — MANNITOL 20 G/100ML
25 INJECTION, SOLUTION INTRAVENOUS ONCE
Status: COMPLETED | OUTPATIENT
Start: 2025-03-18 | End: 2025-03-18

## 2025-03-18 RX ORDER — MOXIFLOXACIN 5 MG/ML
1 SOLUTION/ DROPS OPHTHALMIC 3 TIMES DAILY
Status: DISCONTINUED | OUTPATIENT
Start: 2025-03-18 | End: 2025-03-18 | Stop reason: HOSPADM

## 2025-03-18 RX ORDER — POVIDONE-IODINE 5 %
SOLUTION, NON-ORAL OPHTHALMIC (EYE) AS NEEDED
Status: DISCONTINUED | OUTPATIENT
Start: 2025-03-18 | End: 2025-03-18 | Stop reason: HOSPADM

## 2025-03-18 RX ORDER — CEFAZOLIN 1 G/1
INJECTION, POWDER, FOR SOLUTION INTRAVENOUS AS NEEDED
Status: DISCONTINUED | OUTPATIENT
Start: 2025-03-18 | End: 2025-03-18 | Stop reason: HOSPADM

## 2025-03-18 RX ORDER — NEOMYCIN SULFATE, POLYMYXIN B SULFATE, AND DEXAMETHASONE 3.5; 10000; 1 MG/G; [USP'U]/G; MG/G
OINTMENT OPHTHALMIC AS NEEDED
Status: DISCONTINUED | OUTPATIENT
Start: 2025-03-18 | End: 2025-03-18 | Stop reason: HOSPADM

## 2025-03-18 RX ORDER — ONDANSETRON HYDROCHLORIDE 2 MG/ML
INJECTION, SOLUTION INTRAVENOUS AS NEEDED
Status: DISCONTINUED | OUTPATIENT
Start: 2025-03-18 | End: 2025-03-18

## 2025-03-18 RX ORDER — PREDNISOLONE ACETATE 10 MG/ML
1 SUSPENSION/ DROPS OPHTHALMIC
Qty: 5 ML | Refills: 0 | Status: SHIPPED | OUTPATIENT
Start: 2025-03-18 | End: 2025-03-24 | Stop reason: SDUPTHER

## 2025-03-18 RX ORDER — PROPOFOL 10 MG/ML
INJECTION, EMULSION INTRAVENOUS CONTINUOUS PRN
Status: DISCONTINUED | OUTPATIENT
Start: 2025-03-18 | End: 2025-03-18

## 2025-03-18 RX ORDER — MOXIFLOXACIN 5 MG/ML
1 SOLUTION/ DROPS OPHTHALMIC 4 TIMES DAILY
Qty: 5 ML | Refills: 0 | Status: SHIPPED | OUTPATIENT
Start: 2025-03-18

## 2025-03-18 RX ORDER — MIDAZOLAM HYDROCHLORIDE 1 MG/ML
INJECTION, SOLUTION INTRAMUSCULAR; INTRAVENOUS AS NEEDED
Status: DISCONTINUED | OUTPATIENT
Start: 2025-03-18 | End: 2025-03-18

## 2025-03-18 RX ORDER — FENTANYL CITRATE 50 UG/ML
INJECTION, SOLUTION INTRAMUSCULAR; INTRAVENOUS AS NEEDED
Status: DISCONTINUED | OUTPATIENT
Start: 2025-03-18 | End: 2025-03-18

## 2025-03-18 RX ORDER — BUPIVACAINE HYDROCHLORIDE 7.5 MG/ML
INJECTION, SOLUTION EPIDURAL; RETROBULBAR AS NEEDED
Status: DISCONTINUED | OUTPATIENT
Start: 2025-03-18 | End: 2025-03-18 | Stop reason: HOSPADM

## 2025-03-18 RX ORDER — DEXAMETHASONE SODIUM PHOSPHATE 10 MG/ML
INJECTION INTRAMUSCULAR; INTRAVENOUS AS NEEDED
Status: DISCONTINUED | OUTPATIENT
Start: 2025-03-18 | End: 2025-03-18 | Stop reason: HOSPADM

## 2025-03-18 RX ORDER — TETRACAINE HYDROCHLORIDE 5 MG/ML
1 SOLUTION OPHTHALMIC ONCE
Status: COMPLETED | OUTPATIENT
Start: 2025-03-18 | End: 2025-03-18

## 2025-03-18 RX ADMIN — FENTANYL CITRATE 50 MCG: 50 INJECTION, SOLUTION INTRAMUSCULAR; INTRAVENOUS at 13:09

## 2025-03-18 RX ADMIN — MOXIFLOXACIN HYDROCHLORIDE 1 DROP: 5 SOLUTION/ DROPS OPHTHALMIC at 12:14

## 2025-03-18 RX ADMIN — PROPOFOL 100 MCG/KG/MIN: 10 INJECTION, EMULSION INTRAVENOUS at 13:09

## 2025-03-18 RX ADMIN — TETRACAINE HYDROCHLORIDE 1 DROP: 5 SOLUTION OPHTHALMIC at 12:04

## 2025-03-18 RX ADMIN — PROPOFOL 20.5 MG: 10 INJECTION, EMULSION INTRAVENOUS at 13:12

## 2025-03-18 RX ADMIN — MANNITOL 25 G: 20 INJECTION, SOLUTION INTRAVENOUS at 12:00

## 2025-03-18 RX ADMIN — MOXIFLOXACIN HYDROCHLORIDE 1 DROP: 5 SOLUTION/ DROPS OPHTHALMIC at 12:09

## 2025-03-18 RX ADMIN — ONDANSETRON 4 MG: 2 INJECTION INTRAMUSCULAR; INTRAVENOUS at 15:06

## 2025-03-18 RX ADMIN — FENTANYL CITRATE 50 MCG: 50 INJECTION, SOLUTION INTRAMUSCULAR; INTRAVENOUS at 13:02

## 2025-03-18 RX ADMIN — MOXIFLOXACIN HYDROCHLORIDE 1 DROP: 5 SOLUTION/ DROPS OPHTHALMIC at 12:04

## 2025-03-18 RX ADMIN — PROPOFOL 60 MG: 10 INJECTION, EMULSION INTRAVENOUS at 13:10

## 2025-03-18 RX ADMIN — MIDAZOLAM 2 MG: 1 INJECTION INTRAMUSCULAR; INTRAVENOUS at 13:02

## 2025-03-18 SDOH — HEALTH STABILITY: MENTAL HEALTH: CURRENT SMOKER: 0

## 2025-03-18 ASSESSMENT — PAIN SCALES - GENERAL
PAINLEVEL_OUTOF10: 1
PAINLEVEL_OUTOF10: 0 - NO PAIN
PAIN_LEVEL: 0
PAINLEVEL_OUTOF10: 0 - NO PAIN

## 2025-03-18 ASSESSMENT — COLUMBIA-SUICIDE SEVERITY RATING SCALE - C-SSRS
2. HAVE YOU ACTUALLY HAD ANY THOUGHTS OF KILLING YOURSELF?: NO
1. IN THE PAST MONTH, HAVE YOU WISHED YOU WERE DEAD OR WISHED YOU COULD GO TO SLEEP AND NOT WAKE UP?: NO
6. HAVE YOU EVER DONE ANYTHING, STARTED TO DO ANYTHING, OR PREPARED TO DO ANYTHING TO END YOUR LIFE?: NO

## 2025-03-18 ASSESSMENT — ENCOUNTER SYMPTOMS
CONSTITUTIONAL NEGATIVE: 1
CARDIOVASCULAR NEGATIVE: 1
RESPIRATORY NEGATIVE: 1

## 2025-03-18 ASSESSMENT — PAIN - FUNCTIONAL ASSESSMENT
PAIN_FUNCTIONAL_ASSESSMENT: 0-10
PAIN_FUNCTIONAL_ASSESSMENT: 0-10

## 2025-03-18 NOTE — ANESTHESIA PREPROCEDURE EVALUATION
Patient: Azeb Amato    Procedure Information       Date/Time: 03/18/25 1146    Procedure: KERATOPLASTY, PENETRATING (Right) - retrobulbar block    Location: [facility] OR 04 / Virtual [facility]    Surgeons: Erwin Zaidi MD            Relevant Problems   Anesthesia (within normal limits)      Neuro   (+) Bipolar 1 disorder (Multi)   (+) Myasthenia gravis      HEENT   (+) Vision loss       Clinical information reviewed:   Tobacco  Allergies  Meds   Med Hx  Surg Hx  OB Status  Fam Hx  Soc   Hx        NPO Detail:  NPO/Void Status  Carbohydrate Drink Given Prior to Surgery? : N  Date of Last Liquid: 03/18/25  Time of Last Liquid: 0730  Date of Last Solid: 03/17/25  Time of Last Solid: 1900  Last Intake Type: Clear fluids  Time of Last Void: 1200         Physical Exam    Airway  Mallampati: II  TM distance: >3 FB  Neck ROM: full     Cardiovascular - normal exam     Dental - normal exam     Pulmonary - normal exam     Abdominal            Anesthesia Plan    History of general anesthesia?: yes  History of complications of general anesthesia?: no    ASA 2     MAC     The patient is not a current smoker.    intravenous induction   Anesthetic plan and risks discussed with patient.  Use of blood products discussed with patient who.    Plan discussed with [facility].

## 2025-03-18 NOTE — H&P
"History Of Present Illness  Azeb Amato is a 39 y.o. female presenting with corneal scarring of the right eye who presents for penetrating keratoplasty of the right eye.     Past Medical History  Past Medical History:   Diagnosis Date    Anxiety     Bipolar 1 disorder (Multi)     Depression     Myasthenia gravis     Panic attacks     PONV (postoperative nausea and vomiting)     Right corneal scar with opacity        Surgical History  Past Surgical History:   Procedure Laterality Date    ADENOIDECTOMY      CERVICAL CONIZATION   W/ LASER       SECTION, LOW TRANSVERSE      EXPLORATORY LAPAROTOMY      looked at ovaries, \"found nothing\"    TONSILLECTOMY      TOTAL THYMECTOMY          Social History  She reports that she quit smoking about 10 years ago. Her smoking use included cigarettes. She has never used smokeless tobacco. She reports that she does not drink alcohol and does not use drugs.    Family History  Family History   Problem Relation Name Age of Onset    Bipolar disorder Mother      Hyperlipidemia Mother      Hypertension Father      Depression Brother          Allergies  Patient has no known allergies.    No current facility-administered medications on file prior to encounter.     Current Outpatient Medications on File Prior to Encounter   Medication Sig Dispense Refill    carBAMazepine XR (TEGretol  XR) 200 mg 12 hr tablet Take 2 tablets (400 mg) by mouth 2 times a day. Do not crush, chew, or split. (Patient taking differently: Take 2 tablets (400 mg) by mouth 2 times a day. Do not crush, chew, or split.  200 mg in the morning and 400 mg at night) 120 tablet 0    folic acid (Folvite) 1 mg tablet Take by mouth once daily.      OLANZapine (ZyPREXA) 10 mg tablet Take 1 tablet (10 mg) by mouth once daily at bedtime.      pyridostigmine (Mestinon) 60 mg tablet Take 1 tablet (60 mg) by mouth 6 times a day.      levonorgestrel (Mirena) 21 mcg/24 hours (8 yrs) 52 mg IUD 52 mg by intrauterine route " "1 time.      ondansetron ODT (Zofran-ODT) 4 mg disintegrating tablet Take 1 tablet (4 mg) by mouth every 8 hours if needed for nausea or vomiting. 30 tablet 2       Review of Systems   Constitutional: Negative.    Respiratory: Negative.     Cardiovascular: Negative.         Physical Exam  Constitutional:       Appearance: Normal appearance.   HENT:      Head: Normocephalic and atraumatic.   Cardiovascular:      Rate and Rhythm: Normal rate and regular rhythm.   Pulmonary:      Effort: Pulmonary effort is normal.      Breath sounds: Normal breath sounds.   Neurological:      Mental Status: She is alert.          Last Recorded Vitals  Blood pressure 106/55, pulse 78, temperature 36.4 °C (97.5 °F), temperature source Temporal, resp. rate 16, height 1.651 m (5' 5\"), weight 59.7 kg (131 lb 9.8 oz), SpO2 98%.    Relevant Results             Assessment/Plan   Assessment & Plan  Corneal scar and opacity      Azeb Amato is a 39 y.o. female presenting with corneal scarring of the right eye who presents for penetrating keratoplasty of the right eye.           Maria Del Carmen Browning MD    "

## 2025-03-18 NOTE — ANESTHESIA POSTPROCEDURE EVALUATION
Patient: Azeb Amato    Procedure Summary       Date: 03/18/25 [place] [facility] [place] [facility] [place] [facility] [place] [facility] [place]    Anesthesia Start: 1303 Anesthesia Stop: 1517    Procedure: KERATOPLASTY, PENETRATING (Right: Eye) Diagnosis:       Corneal scar and opacity      (Corneal scar and opacity [H17.9])    Surgeons: Erwin Zaidi MD Responsible Provider: Shabana Dietrich DO    Anesthesia Type: MAC ASA Status: 2            Anesthesia Type: MAC    Vitals Value Taken Time   /72 03/18/25 1513   Temp 36.2 °C (97.2 °F) 03/18/25 1513   Pulse 90 03/18/25 1513   Resp 16 03/18/25 1513   SpO2 99 % 03/18/25 1513       Anesthesia Post Evaluation    Patient location during evaluation: PACU  Patient participation: complete - patient participated  Level of consciousness: awake  Pain score: 0  Pain management: adequate  Airway patency: patent  Cardiovascular status: acceptable  Respiratory status: acceptable  Hydration status: acceptable  Postoperative Nausea and Vomiting: none        No notable events documented.

## 2025-03-18 NOTE — OP NOTE
KERATOPLASTY, PENETRATING (R) Operative Note     Date: 3/18/2025  OR Location: [facility] OR    Name: Azeb Amato : 1985, Age: 39 y.o., MRN: [id number], Sex: female    Diagnosis  Pre-op Diagnosis      * Corneal scar and opacity [H17.9] Post-op Diagnosis     * Corneal scar and opacity [H17.9]     Procedures  KERATOPLASTY, PENETRATING  94443 - [place] KERATOPLASTY PENTRG EXCEPT APHAKIA/PSEUDOPHAKIA      Surgeons      * Erwin Zaidi - Primary    Resident/Fellow/Other Assistant:  Surgeons and Role:  * No surgeons found with a matching role *    Staff:   Scrub Person: Aicha  Circulator: Bertha    Anesthesia Staff: Anesthesiologist: Shabana Dietrich DO  C-AA: YI Davis    Procedure Summary  Anesthesia: Monitor Anesthesia Care  ASA: II  Estimated Blood Loss: minimal mL  Intra-op Medications:   Administrations occurring from 1146 to 1301 on 25:   Medication Name Total Dose   moxifloxacin (Vigamox) 0.5 % ophthalmic solution 1 drop 3 drop   mannitol 20 % IV 25 g 25 g   tetracaine (PF) 0.5 % ophthalmic solution 1 drop 1 drop              Anesthesia Record               Intraprocedure I/O Totals       None           Specimen:   ID Type Source Tests Collected by Time   1 : RIGHT CORNEA Tissue CORNEA RIGHT SURGICAL PATHOLOGY EXAM Erwin Zaidi MD 3/18/2025 1340   A : DONOR CORNEA SWAB, [id number] Swab CORNEA RIGHT FUNGAL CULTURE/SMEAR, TISSUE/WOUND CULTURE/SMEAR Erwin Zaidi MD 3/18/2025 1337                 Drains and/or Catheters: * None in log *    Tourniquet Times:         Implants:  Implants       Type Name Action Serial No.      Cornea DONOR CORNEA Implanted [id number]              Findings: Right corneal scarring.    Indications: Azeb Amato is an 39 y.o. female who is having surgery for Corneal scar and opacity [H17.9].     The patient was seen in the preoperative area. The risks, benefits, complications, treatment options, non-operative alternatives, expected  recovery and outcomes were discussed with the patient. The possibilities of reaction to medication, pulmonary aspiration, injury to surrounding structures, bleeding, recurrent infection, the need for additional procedures, failure to diagnose a condition, and creating a complication requiring transfusion or operation were discussed with the patient. The patient concurred with the proposed plan, giving informed consent.  The site of surgery was properly noted/marked if necessary per policy. The patient has been actively warmed in preoperative area. Preoperative antibiotics are not indicated. Venous thrombosis prophylaxis are not indicated.    Procedure Details: After obtaining informed consent, the patient was placed in the supine position on the operating room table where appropriate blood pressure and cardiac monitoring were initiated. Retrobulbar anesthesia block was given. The patient was prepped and draped in the usual sterile fashion for intraocular surgery. This included instillation of Betadine 5% onto the ocular surface followed by irrigation with balance salt solution.    A lid speculum was placed and the operating microscope was positioned.    There was significant corneal scarring. The cornea was examined again, and the optical axis was marked with a sterile marking pen in four quadrants after measuring 2mm from the limbus with calipers. The host cornea was marked by applying brief gentle pressure with an 7.75 mm trephine. The internal cayetano made by the trephine was outlined with sequential dots.     Attention was turned to the donor cornea. The donor cornea was trephined with an 8.25 mm trephine after being placed in a donor corneal punch that had been marked with a marker in the punch holes. The cornea was placed in the punch endothelial side up. The donor rim was sent for culture on a culture plate. The donor button was covered with OptiSeal and placed aside.    Next, the host cornea was trephined  centrally (based on markings) with an 7.75mm trephine until the anterior chamber was entered.The edge of the trephine recipient cornea was lifted with a 0.12 forceps and corneal scissors were used to excise the recipient button.         Attention was turned toward placing the donor cornea. The anterior chamber was coated with Healon. The donor tissue was transferred onto the recipient bed atop the viscoelastic. The four interrupted 10-0 nylon cardinal sutures were placed first. The first stitch was placed with the assistance of Pollack forceps. Twelve additional radial interrupted 10-0 nylon sutures were placed snugly. Care was taken to pass sutures in the donor stroma (not full thickness) and 90% depth into the host tissue.    The interrupted suture knots were buried on the donor side. The anterior chamber was reformed with BSS on a 30-gauge cannula, irrigating all of the Healon from the eye.    Subconjunctival antibiotic (Ancef) and steroid were given. The wound was checked with a Weck-Sue sponge and fluorescein strip for leaks. At the end of the procedure, the wound was Nancy negative. The lid speculum was removed. The drapes were gently removed. Wet and dry sponges were used to clean the eye. Next, a light eye patch was taped in place and covered with an eye shield. The patient was extubated without complications and taken to the PACU in good condition.   Complications:  None; patient tolerated the procedure well.    Disposition: PACU - hemodynamically stable.  Condition: stable                 Additional Details: none    Attending Attestation: I performed the procedure.    Erwin Zaidi  Phone Number: [phone]

## 2025-03-19 ENCOUNTER — OFFICE VISIT (OUTPATIENT)
Dept: OPHTHALMOLOGY | Facility: CLINIC | Age: 40
End: 2025-03-19
Payer: COMMERCIAL

## 2025-03-19 DIAGNOSIS — Z94.7 STATUS POST PENETRATING KERATOPLASTY: Primary | ICD-10-CM

## 2025-03-19 PROCEDURE — 99024 POSTOP FOLLOW-UP VISIT: CPT | Performed by: OPHTHALMOLOGY

## 2025-03-19 ASSESSMENT — VISUAL ACUITY
OD_SC: 20/400
METHOD: SNELLEN - LINEAR
OD_PH_SC: 20/200

## 2025-03-19 ASSESSMENT — SLIT LAMP EXAM - LIDS
COMMENTS: NORMAL
COMMENTS: NORMAL

## 2025-03-19 ASSESSMENT — TONOMETRY
OD_IOP_MMHG: 18
IOP_METHOD: GOLDMANN APPLANATION

## 2025-03-19 ASSESSMENT — EXTERNAL EXAM - LEFT EYE: OS_EXAM: NORMAL

## 2025-03-19 ASSESSMENT — EXTERNAL EXAM - RIGHT EYE: OD_EXAM: NORMAL

## 2025-03-19 NOTE — PROGRESS NOTES
Assessment/Plan   Diagnoses and all orders for this visit:  Status post penetrating keratoplasty    POD#1, post penetrating keratoplasty (PK), +2 edema, no loose sutures, Deep anterior chamber (AC). Suture leak at 11 o'clock suture (BCL applied).    Plan:  Pred QID  Moxi QID  Return precautions   See in 1 week.

## 2025-03-20 LAB
BACTERIA SPEC CULT: NORMAL
FUNGUS SPEC CULT: NORMAL
FUNGUS SPEC FUNGUS STN: NORMAL
GRAM STN SPEC: NORMAL
GRAM STN SPEC: NORMAL

## 2025-03-24 ENCOUNTER — APPOINTMENT (OUTPATIENT)
Dept: OPHTHALMOLOGY | Age: 40
End: 2025-03-24
Payer: COMMERCIAL

## 2025-03-24 DIAGNOSIS — Z94.7 STATUS POST PENETRATING KERATOPLASTY: Primary | ICD-10-CM

## 2025-03-24 DIAGNOSIS — B00.52 HSV STROMAL KERATITIS: ICD-10-CM

## 2025-03-24 DIAGNOSIS — H17.9 RIGHT CORNEAL SCAR: ICD-10-CM

## 2025-03-24 DIAGNOSIS — H17.9 CORNEAL SCAR AND OPACITY: ICD-10-CM

## 2025-03-24 LAB
FUNGUS SPEC CULT: NORMAL
FUNGUS SPEC FUNGUS STN: NORMAL

## 2025-03-24 PROCEDURE — 99024 POSTOP FOLLOW-UP VISIT: CPT | Performed by: OPHTHALMOLOGY

## 2025-03-24 RX ORDER — VALACYCLOVIR HYDROCHLORIDE 500 MG/1
500 TABLET, FILM COATED ORAL DAILY
Qty: 21 TABLET | Refills: 0 | Status: SHIPPED | OUTPATIENT
Start: 2025-03-24 | End: 2025-04-14

## 2025-03-24 RX ORDER — PREDNISOLONE ACETATE 10 MG/ML
1 SUSPENSION/ DROPS OPHTHALMIC
Qty: 5 ML | Refills: 0 | Status: SHIPPED | OUTPATIENT
Start: 2025-03-24 | End: 2025-03-24

## 2025-03-24 RX ORDER — PREDNISOLONE ACETATE 10 MG/ML
1 SUSPENSION/ DROPS OPHTHALMIC
Qty: 5 ML | Refills: 11 | Status: SHIPPED | OUTPATIENT
Start: 2025-03-24

## 2025-03-24 ASSESSMENT — SLIT LAMP EXAM - LIDS
COMMENTS: NORMAL
COMMENTS: NORMAL

## 2025-03-24 ASSESSMENT — ENCOUNTER SYMPTOMS: EYES NEGATIVE: 1

## 2025-03-24 ASSESSMENT — EXTERNAL EXAM - LEFT EYE: OS_EXAM: NORMAL

## 2025-03-24 ASSESSMENT — EXTERNAL EXAM - RIGHT EYE: OD_EXAM: NORMAL

## 2025-03-24 ASSESSMENT — TONOMETRY
IOP_METHOD: GOLDMANN APPLANATION
OD_IOP_MMHG: 15

## 2025-03-24 ASSESSMENT — VISUAL ACUITY
METHOD: SNELLEN - LINEAR
OD_SC: 20/200

## 2025-03-24 NOTE — PROGRESS NOTES
Assessment/Plan   Diagnoses and all orders for this visit:  Status post penetrating keratoplasty    POW#1 post penetrating keratoplasty (PK) OD (3/18/25)  Suture leak at 11:00 on POD#1 with BCL placed, now resolved, BCL removed today  Today with +1 edema, no loose sutures, deep AC    Plan:  Increase Pred to q1h while awake  Continue Moxi QID until gone  Valtrex 500 mg once daily  Cold PFATs for itchiness  Return precautions   See in 2-3 weeks.

## 2025-03-25 ENCOUNTER — APPOINTMENT (OUTPATIENT)
Dept: OPHTHALMOLOGY | Facility: CLINIC | Age: 40
End: 2025-03-25
Payer: COMMERCIAL

## 2025-03-31 LAB
FUNGUS SPEC CULT: NORMAL
FUNGUS SPEC FUNGUS STN: NORMAL

## 2025-04-01 LAB
LABORATORY COMMENT REPORT: NORMAL
PATH REPORT.FINAL DX SPEC: NORMAL
PATH REPORT.GROSS SPEC: NORMAL
PATH REPORT.RELEVANT HX SPEC: NORMAL
PATH REPORT.TOTAL CANCER: NORMAL

## 2025-04-07 ENCOUNTER — APPOINTMENT (OUTPATIENT)
Dept: OPHTHALMOLOGY | Age: 40
End: 2025-04-07
Payer: COMMERCIAL

## 2025-04-07 DIAGNOSIS — Z94.7 STATUS POST PENETRATING KERATOPLASTY: Primary | ICD-10-CM

## 2025-04-07 LAB
FUNGUS SPEC CULT: NORMAL
FUNGUS SPEC FUNGUS STN: NORMAL

## 2025-04-07 PROCEDURE — 99024 POSTOP FOLLOW-UP VISIT: CPT | Performed by: OPHTHALMOLOGY

## 2025-04-07 ASSESSMENT — EXTERNAL EXAM - RIGHT EYE: OD_EXAM: NORMAL

## 2025-04-07 ASSESSMENT — TONOMETRY
OS_IOP_MMHG: DEFER
OD_IOP_MMHG: 17
IOP_METHOD: GOLDMANN APPLANATION

## 2025-04-07 ASSESSMENT — SLIT LAMP EXAM - LIDS
COMMENTS: NORMAL
COMMENTS: NORMAL

## 2025-04-07 ASSESSMENT — VISUAL ACUITY
METHOD: SNELLEN - LINEAR
OD_PH_SC+: +2
OD_PH_SC: 20/40
OD_SC: 20/100
OD_SC+: +1

## 2025-04-07 ASSESSMENT — EXTERNAL EXAM - LEFT EYE: OS_EXAM: NORMAL

## 2025-04-07 NOTE — PROGRESS NOTES
Assessment/Plan   Diagnoses and all orders for this visit:  Status post penetrating keratoplasty    POM#1 post penetrating keratoplasty (PK) OD (3/18/25)  Clear cornea, no edema, Quiet anterior chamber (AC)     Plan:  Decrease Pred to q2h while awake  Stop Valtrex 500 mg once daily  Cold PFATs for itchiness  Return precautions   See in 1 month.

## 2025-04-09 LAB
LABORATORY COMMENT REPORT: NORMAL
PATH REPORT.ADDENDUM SPEC: NORMAL
PATH REPORT.FINAL DX SPEC: NORMAL
PATH REPORT.GROSS SPEC: NORMAL
PATH REPORT.RELEVANT HX SPEC: NORMAL
PATH REPORT.TOTAL CANCER: NORMAL

## 2025-04-09 PROCEDURE — 88313 SPECIAL STAINS GROUP 2: CPT | Performed by: PATHOLOGY

## 2025-04-09 PROCEDURE — 88312 SPECIAL STAINS GROUP 1: CPT | Performed by: PATHOLOGY

## 2025-04-09 PROCEDURE — 88312 SPECIAL STAINS GROUP 1: CPT | Mod: TC,SUR | Performed by: OPHTHALMOLOGY

## 2025-04-16 ENCOUNTER — OFFICE VISIT (OUTPATIENT)
Dept: OPHTHALMOLOGY | Facility: CLINIC | Age: 40
End: 2025-04-16
Payer: COMMERCIAL

## 2025-04-16 DIAGNOSIS — Z94.7 STATUS POST PENETRATING KERATOPLASTY: Primary | ICD-10-CM

## 2025-04-16 DIAGNOSIS — H17.9 RIGHT CORNEAL SCAR: ICD-10-CM

## 2025-04-16 PROCEDURE — 99024 POSTOP FOLLOW-UP VISIT: CPT | Performed by: OPHTHALMOLOGY

## 2025-04-16 ASSESSMENT — TONOMETRY
IOP_METHOD: TONOPEN
OS_IOP_MMHG: 13
OD_IOP_MMHG: 18

## 2025-04-16 ASSESSMENT — ENCOUNTER SYMPTOMS
CARDIOVASCULAR NEGATIVE: 0
RESPIRATORY NEGATIVE: 0
PSYCHIATRIC NEGATIVE: 0
HEMATOLOGIC/LYMPHATIC NEGATIVE: 0
MUSCULOSKELETAL NEGATIVE: 0
ALLERGIC/IMMUNOLOGIC NEGATIVE: 0
ENDOCRINE NEGATIVE: 0
CONSTITUTIONAL NEGATIVE: 0
EYES NEGATIVE: 0
NEUROLOGICAL NEGATIVE: 0
GASTROINTESTINAL NEGATIVE: 0

## 2025-04-16 ASSESSMENT — SLIT LAMP EXAM - LIDS
COMMENTS: NORMAL
COMMENTS: NORMAL

## 2025-04-16 ASSESSMENT — EXTERNAL EXAM - RIGHT EYE: OD_EXAM: NORMAL

## 2025-04-16 ASSESSMENT — VISUAL ACUITY
OS_SC: 20/20
OD_SC: 20/200
OS_SC+: -1
OD_PH_SC: 20/30
METHOD: SNELLEN - LINEAR

## 2025-04-16 ASSESSMENT — EXTERNAL EXAM - LEFT EYE: OS_EXAM: NORMAL

## 2025-04-16 NOTE — PROGRESS NOTES
Assessment/Plan   Diagnoses and all orders for this visit:  Status post penetrating keratoplasty    POM#1 post penetrating keratoplasty (PK) OD (3/18/25)  Clear cornea, no edema, Quiet anterior chamber (AC)     She reports binocular double vision. Explained that this issue is caused by eye misalignment (Right exo), now she can see out of her right eye compared to HM vision pre-op.    Observe double vision for now, might consider glasses in 3 months.      Plan:  Keep Pred to q2h while awake  Cold PFATs for itchiness  Return precautions   See in 1 month.

## 2025-04-21 ENCOUNTER — APPOINTMENT (OUTPATIENT)
Dept: PRIMARY CARE | Facility: CLINIC | Age: 40
End: 2025-04-21
Payer: COMMERCIAL

## 2025-05-02 ENCOUNTER — TELEPHONE (OUTPATIENT)
Dept: PRIMARY CARE | Facility: CLINIC | Age: 40
End: 2025-05-02
Payer: COMMERCIAL

## 2025-05-02 NOTE — TELEPHONE ENCOUNTER
Lab downstairs calling-there is an issue with order created last year,patient is there now . Can nurse call them back at [phone]. I tried to help,but do not know enough about matching diagnosis to the lab being drawn.

## 2025-05-04 LAB
25(OH)D3+25(OH)D2 SERPL-MCNC: 29 NG/ML (ref 30–100)
ALBUMIN SERPL-MCNC: 3.9 G/DL (ref 3.6–5.1)
ALP SERPL-CCNC: 65 U/L (ref 31–125)
ALT SERPL-CCNC: 6 U/L (ref 6–29)
ANION GAP SERPL CALCULATED.4IONS-SCNC: 8 MMOL/L (CALC) (ref 7–17)
AST SERPL-CCNC: 11 U/L (ref 10–30)
BASOPHILS # BLD AUTO: 31 CELLS/UL (ref 0–200)
BASOPHILS NFR BLD AUTO: 0.3 %
BILIRUB SERPL-MCNC: 0.4 MG/DL (ref 0.2–1.2)
BUN SERPL-MCNC: 6 MG/DL (ref 7–25)
CALCIUM SERPL-MCNC: 8.8 MG/DL (ref 8.6–10.2)
CHLORIDE SERPL-SCNC: 99 MMOL/L (ref 98–110)
CHOLEST SERPL-MCNC: 205 MG/DL
CHOLEST/HDLC SERPL: 3 (CALC)
CO2 SERPL-SCNC: 28 MMOL/L (ref 20–32)
CREAT SERPL-MCNC: 0.54 MG/DL (ref 0.5–0.97)
EGFRCR SERPLBLD CKD-EPI 2021: 120 ML/MIN/1.73M2
EOSINOPHIL # BLD AUTO: 41 CELLS/UL (ref 15–500)
EOSINOPHIL NFR BLD AUTO: 0.4 %
ERYTHROCYTE [DISTWIDTH] IN BLOOD BY AUTOMATED COUNT: 12.2 % (ref 11–15)
EST. AVERAGE GLUCOSE BLD GHB EST-MCNC: 108 MG/DL
EST. AVERAGE GLUCOSE BLD GHB EST-SCNC: 6 MMOL/L
GLUCOSE SERPL-MCNC: 80 MG/DL (ref 65–99)
HBA1C MFR BLD: 5.4 %
HCT VFR BLD AUTO: 35.5 % (ref 35–45)
HDLC SERPL-MCNC: 68 MG/DL
HGB BLD-MCNC: 11.9 G/DL (ref 11.7–15.5)
LDLC SERPL CALC-MCNC: 118 MG/DL (CALC)
LYMPHOCYTES # BLD AUTO: 979 CELLS/UL (ref 850–3900)
LYMPHOCYTES NFR BLD AUTO: 9.5 %
MCH RBC QN AUTO: 31.2 PG (ref 27–33)
MCHC RBC AUTO-ENTMCNC: 33.5 G/DL (ref 32–36)
MCV RBC AUTO: 92.9 FL (ref 80–100)
MONOCYTES # BLD AUTO: 1380 CELLS/UL (ref 200–950)
MONOCYTES NFR BLD AUTO: 13.4 %
NEUTROPHILS # BLD AUTO: 7869 CELLS/UL (ref 1500–7800)
NEUTROPHILS NFR BLD AUTO: 76.4 %
NONHDLC SERPL-MCNC: 137 MG/DL (CALC)
PLATELET # BLD AUTO: 289 THOUSAND/UL (ref 140–400)
PMV BLD REES-ECKER: 9.4 FL (ref 7.5–12.5)
POTASSIUM SERPL-SCNC: 4.5 MMOL/L (ref 3.5–5.3)
PROT SERPL-MCNC: 6.8 G/DL (ref 6.1–8.1)
RBC # BLD AUTO: 3.82 MILLION/UL (ref 3.8–5.1)
SODIUM SERPL-SCNC: 135 MMOL/L (ref 135–146)
TRIGL SERPL-MCNC: 88 MG/DL
TSH SERPL-ACNC: 2.29 MIU/L
WBC # BLD AUTO: 10.3 THOUSAND/UL (ref 3.8–10.8)

## 2025-05-05 ENCOUNTER — APPOINTMENT (OUTPATIENT)
Dept: PRIMARY CARE | Facility: CLINIC | Age: 40
End: 2025-05-05
Payer: COMMERCIAL

## 2025-05-05 VITALS
WEIGHT: 136.8 LBS | HEART RATE: 69 BPM | RESPIRATION RATE: 16 BRPM | DIASTOLIC BLOOD PRESSURE: 71 MMHG | SYSTOLIC BLOOD PRESSURE: 113 MMHG | HEIGHT: 65 IN | BODY MASS INDEX: 22.79 KG/M2

## 2025-05-05 DIAGNOSIS — G70.00 MYASTHENIA GRAVIS: Primary | ICD-10-CM

## 2025-05-05 DIAGNOSIS — K58.0 IRRITABLE BOWEL SYNDROME WITH DIARRHEA: ICD-10-CM

## 2025-05-05 DIAGNOSIS — R10.30 LOWER ABDOMINAL PAIN: ICD-10-CM

## 2025-05-05 DIAGNOSIS — F31.9 BIPOLAR 1 DISORDER (MULTI): ICD-10-CM

## 2025-05-05 PROCEDURE — 3008F BODY MASS INDEX DOCD: CPT | Performed by: INTERNAL MEDICINE

## 2025-05-05 PROCEDURE — 99214 OFFICE O/P EST MOD 30 MIN: CPT | Performed by: INTERNAL MEDICINE

## 2025-05-05 PROCEDURE — 1036F TOBACCO NON-USER: CPT | Performed by: INTERNAL MEDICINE

## 2025-05-05 RX ORDER — DICYCLOMINE HYDROCHLORIDE 20 MG/1
20 TABLET ORAL 3 TIMES DAILY PRN
Qty: 90 TABLET | Refills: 0 | Status: SHIPPED | OUTPATIENT
Start: 2025-05-05

## 2025-05-05 ASSESSMENT — ENCOUNTER SYMPTOMS
COUGH: 0
NAUSEA: 1
DIARRHEA: 1
PALPITATIONS: 0
DIZZINESS: 0
HEADACHES: 0
SHORTNESS OF BREATH: 0
CONSTIPATION: 0
ABDOMINAL PAIN: 1

## 2025-05-05 ASSESSMENT — PAIN SCALES - GENERAL: PAINLEVEL_OUTOF10: 6

## 2025-05-05 NOTE — PROGRESS NOTES
"Subjective   Patient ID: Azeb Amato is a 40 y.o. female who presents for follow up.    HPI     Patient presents today for a 6 month follow-up.     Recent concerns of intense stomach cramping that comes and goes in waves with lots of \"stomach gurgling\". Also having diarrhea, but admits to a capful of Miralax daily. Admits to nausea but no vomiting. She put herself on a liquid diet but it has not helped. Consuming broth and coffee with oatmilk. She started the Miralax due to previous constipation. Also taking tylenol OTC for the abdominal pain.      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 Following with the eye surgeon.     Divorce finalized in December 2024. Work has leveled out and stress has decreased.     Has complaints of increased discharge with a foul odor. Denies any concern of STI's or vaginal itching.          Latest Reference Range & Units 05/03/25 11:33   GLUCOSE 65 - 99 mg/dL 80   SODIUM 135 - 146 mmol/L 135   POTASSIUM 3.5 - 5.3 mmol/L 4.5   CHLORIDE 98 - 110 mmol/L 99   CARBON DIOXIDE 20 - 32 mmol/L 28   ELECTROLYTE BALANCE 7 - 17 mmol/L (calc) 8   UREA NITROGEN (BUN) 7 - 25 mg/dL 6 (L)   CREATININE 0.50 - 0.97 mg/dL 0.54   EGFR > OR = 60 mL/min/1.73m2 120   CALCIUM 8.6 - 10.2 mg/dL 8.8   ALBUMIN 3.6 - 5.1 g/dL 3.9   PROTEIN, TOTAL 6.1 - 8.1 g/dL 6.8   ALKALINE PHOSPHATASE 31 - 125 U/L 65   ALT 6 - 29 U/L 6   AST 10 - 30 U/L 11   BILIRUBIN, TOTAL 0.2 - 1.2 mg/dL 0.4   CHOLESTEROL, TOTAL <200 mg/dL 205 (H)   HDL CHOLESTEROL > OR = 50 mg/dL 68   CHOL/HDLC RATIO <5.0 (calc) 3.0   LDL-CHOLESTEROL mg/dL (calc) 118 (H)   TRIGLYCERIDES <150 mg/dL 88   NON HDL CHOLESTEROL <130 mg/dL (calc) 137 (H)   HEMOGLOBIN A1c <5.7 % 5.4   eAG (mg/dL) mg/dL 108   eAG (mmol/L) mmol/L 6.0   TSH mIU/L 2.29   VITAMIN D,25-OH,TOTAL,IA 30 - 100 ng/mL 29 (L)   WHITE BLOOD CELL COUNT 3.8 - 10.8 Thousand/uL 10.3   RED BLOOD CELL COUNT 3.80 - 5.10 Million/uL 3.82   HEMOGLOBIN 11.7 - 15.5 g/dL 11.9   HEMATOCRIT 35.0 - 45.0 % 35.5 " "  MCV 80.0 - 100.0 fL 92.9   MCH 27.0 - 33.0 pg 31.2   MCHC 32.0 - 36.0 g/dL 33.5   RDW 11.0 - 15.0 % 12.2   PLATELET COUNT 140 - 400 Thousand/uL 289   MPV 7.5 - 12.5 fL 9.4   ABSOLUTE NEUTROPHILS 1,500 - 7,800 cells/uL 7,869 (H)   ABSOLUTE LYMPHOCYTES 850 - 3,900 cells/uL 979   ABSOLUTE MONOCYTES 200 - 950 cells/uL 1,380 (H)   ABSOLUTE EOSINOPHILS 15 - 500 cells/uL 41   ABSOLUTE BASOPHILS 0 - 200 cells/uL 31   NEUTROPHILS % 76.4   LYMPHOCYTES % 9.5   MONOCYTES % 13.4   EOSINOPHILS % 0.4   BASOPHILS % 0.3   (L): Data is abnormally low  (H): Data is abnormally high    Review of Systems   Respiratory:  Negative for cough and shortness of breath.    Cardiovascular:  Negative for chest pain and palpitations.   Gastrointestinal:  Positive for abdominal pain, diarrhea and nausea. Negative for constipation.   Genitourinary:  Positive for vaginal discharge.   Neurological:  Negative for dizziness and headaches.       Current Medications[1]    Objective   /71   Pulse 69   Resp 16   Ht 1.651 m (5' 5\")   Wt 62.1 kg (136 lb 12.8 oz)   BMI 22.76 kg/m²     Physical Exam  Constitutional:       Appearance: Normal appearance.   Eyes:      Conjunctiva/sclera: Conjunctivae normal.      Pupils: Pupils are equal, round, and reactive to light.   Cardiovascular:      Rate and Rhythm: Normal rate and regular rhythm.      Heart sounds: Normal heart sounds.   Pulmonary:      Effort: Pulmonary effort is normal.      Breath sounds: Normal breath sounds.   Abdominal:      General: Bowel sounds are normal. There is no distension.      Palpations: Abdomen is soft. There is no mass.      Tenderness: There is no abdominal tenderness.   Lymphadenopathy:      Cervical: No cervical adenopathy.   Skin:     General: Skin is warm and dry.   Neurological:      General: No focal deficit present.         Assessment/Plan   Problem List Items Addressed This Visit          Gastrointestinal and Abdominal    Irritable bowel syndrome with diarrhea    " "She endorses intense stomach cramping that comes and goes in waves with lots of \"stomach gurgling\".She is taking a capful of Miralax daily as well as tylenol OTC. She put herself on a liquid diet but it has not helped with the symptoms. Advise to slowly reintroduce bland solid foods. We discussed changing how she takes her Miralax as well until she finds a regimen that works for her- reducing to 1/2 cap daily or EOD.   She has seen gi and had a colonoscopy in the past.   She has gone to ER for abdominal pain in the last 2 years.  Is most likely Irritable bowel syndrome and treatment is individualized.  Rec metamucil/miralax , and also pepto bismol tabs.   Start dicyclomine 20 mg 3x daily PRN         Relevant Medications    dicyclomine (Bentyl) 20 mg tablet    Other Relevant Orders    Follow Up In [facility] - PCP - Established       Mental Health    Bipolar 1 disorder (Multi)    remain on carbamazepine 400 twice a day, and also olanzapine 10 at night. Since things have settled down with her , she is doing better mentally.          Relevant Orders    Follow Up In [facility] - PCP - Established       Neuro    Myasthenia gravis - Primary    Remain on pyridostigmine and also methotrexate. Follows with neuro.          Relevant Orders    Follow Up In [facility] - PCP - Established         Please return to see me in 6 months for a 30 minute follow up.     Scribe Attestation  By signing my name below, IJune, [place]   attest that this documentation has been prepared under the direction and in the presence of Tashia Nobles DO.         [1]   Current Outpatient Medications:     carBAMazepine XR (TEGretol  XR) 200 mg 12 hr tablet, Take 2 tablets (400 mg) by mouth 2 times a day. Do not crush, chew, or split. (Patient taking differently: Take 2 tablets (400 mg) by mouth 2 times a day. Do not crush, chew, or split. 200 mg in the morning and 400 mg at night), Disp: 120 tablet, Rfl: 0    " dicyclomine (Bentyl) 20 mg tablet, Take 1 tablet (20 mg) by mouth 3 times a day as needed (abdominal pain or cramps)., Disp: 90 tablet, Rfl: 0    folic acid (Folvite) 1 mg tablet, Take by mouth once daily., Disp: , Rfl:     levonorgestrel (Mirena) 21 mcg/24 hours (8 yrs) 52 mg IUD, 52 mg by intrauterine route 1 time., Disp: , Rfl:     moxifloxacin (Vigamox) 0.5 % ophthalmic solution, Administer 1 drop into the right eye 4 times a day., Disp: 5 mL, Rfl: 0    OLANZapine (ZyPREXA) 10 mg tablet, Take 1 tablet (10 mg) by mouth once daily at bedtime., Disp: , Rfl:     ondansetron ODT (Zofran-ODT) 4 mg disintegrating tablet, Take 1 tablet (4 mg) by mouth every 8 hours if needed for nausea or vomiting., Disp: 30 tablet, Rfl: 2    prednisoLONE acetate (Pred-Forte) 1 % ophthalmic suspension, Administer 1 drop into the right eye every 1 hour., Disp: 5 mL, Rfl: 11    pyridostigmine (Mestinon) 60 mg tablet, Take 1 tablet (60 mg) by mouth 6 times a day., Disp: , Rfl:

## 2025-05-05 NOTE — PATIENT INSTRUCTIONS
I recommend adding metamucil ( even half a dose daily)  to help bulk up the stool and slow things down.  When you are having loose stools more often, you can cut back or even stop the miralax for a while.    Take dicyclomine 20 mg as needed for abdominal cramping.   Some patients take it regularly about 15 min before eating.     Pepto Bismol can also help: it is available in tablets , and it it not too constipating.  It can make your stools darker or black if you take it     I also recommend taking a probiotic ( align) for a month to see if it helps .   Can use it every day, and for more than a month.     See me again in 6 months for 30 min visit.

## 2025-05-05 NOTE — ASSESSMENT & PLAN NOTE
"She endorses intense stomach cramping that comes and goes in waves with lots of \"stomach gurgling\".She is taking a capful of Miralax daily as well as tylenol OTC. She put herself on a liquid diet but it has not helped with the symptoms. Advise to slowly reintroduce bland solid foods. We discussed changing how she takes her Miralax as well until she finds a regimen that works for her- reducing to 1/2 cap daily or EOD.   Start dicyclomine 20 mg 3x daily PRN  "

## 2025-05-05 NOTE — ASSESSMENT & PLAN NOTE
remain on carbamazepine 400 twice a day, and also olanzapine 10 at night. Since things have settled down with her , she is doing better mentally.

## 2025-05-12 ENCOUNTER — APPOINTMENT (OUTPATIENT)
Dept: OPHTHALMOLOGY | Age: 40
End: 2025-05-12
Payer: COMMERCIAL

## 2025-05-12 DIAGNOSIS — Z94.7 STATUS POST PENETRATING KERATOPLASTY: Primary | ICD-10-CM

## 2025-05-12 PROCEDURE — 99024 POSTOP FOLLOW-UP VISIT: CPT | Performed by: OPHTHALMOLOGY

## 2025-05-12 ASSESSMENT — ENCOUNTER SYMPTOMS
HEMATOLOGIC/LYMPHATIC NEGATIVE: 0
ALLERGIC/IMMUNOLOGIC NEGATIVE: 0
ENDOCRINE NEGATIVE: 0
EYES NEGATIVE: 1
MUSCULOSKELETAL NEGATIVE: 0
CARDIOVASCULAR NEGATIVE: 0
CONSTITUTIONAL NEGATIVE: 0
RESPIRATORY NEGATIVE: 0
GASTROINTESTINAL NEGATIVE: 0
NEUROLOGICAL NEGATIVE: 0
PSYCHIATRIC NEGATIVE: 0

## 2025-05-12 ASSESSMENT — REFRACTION_MANIFEST
OD_CYLINDER: -4.75
OS_AXIS: 133
OS_SPHERE: PLANO
OD_AXIS: 062
METHOD_AUTOREFRACTION: 1
OD_SPHERE: +6.50
OS_CYLINDER: -0.25

## 2025-05-12 ASSESSMENT — VISUAL ACUITY
OD_PH_SC: 20/40
METHOD: SNELLEN - LINEAR
OD_PH_SC+: -2
OD_SC+: -1
OS_SC: 20/20
OD_SC: 20/80

## 2025-05-12 ASSESSMENT — TONOMETRY
IOP_METHOD: TONOPEN
OS_IOP_MMHG: 15
OD_IOP_MMHG: 18

## 2025-05-12 ASSESSMENT — EXTERNAL EXAM - LEFT EYE: OS_EXAM: NORMAL

## 2025-05-12 ASSESSMENT — SLIT LAMP EXAM - LIDS
COMMENTS: NORMAL
COMMENTS: NORMAL

## 2025-05-12 ASSESSMENT — EXTERNAL EXAM - RIGHT EYE: OD_EXAM: NORMAL

## 2025-05-12 NOTE — PROGRESS NOTES
Assessment/Plan   Diagnoses and all orders for this visit:  Status post penetrating keratoplasty    POM#2 post penetrating keratoplasty (PK) OD (3/18/25)  Clear cornea, no edema, Quiet anterior chamber (AC)     She reports binocular double vision. Explained that this issue is caused by eye misalignment (Right exo), now she can see out of her right eye compared to HM vision pre-op.    Observe double vision for now, might consider glasses in 3 months.      Plan:  Decrease Pred to 6 times daily for 1 month then QID until next visit while awake  Cold PFATs for itchiness  Return precautions   See in 2 months.

## 2025-07-07 ENCOUNTER — APPOINTMENT (OUTPATIENT)
Dept: OPHTHALMOLOGY | Age: 40
End: 2025-07-07
Payer: COMMERCIAL

## 2025-07-07 DIAGNOSIS — B00.52 HSV STROMAL KERATITIS: ICD-10-CM

## 2025-07-07 DIAGNOSIS — Z94.7 STATUS POST PENETRATING KERATOPLASTY: Primary | ICD-10-CM

## 2025-07-07 PROCEDURE — 99213 OFFICE O/P EST LOW 20 MIN: CPT | Performed by: OPHTHALMOLOGY

## 2025-07-07 ASSESSMENT — TONOMETRY
OD_IOP_MMHG: 23
IOP_METHOD: GOLDMANN APPLANATION
IOP_METHOD: TONOPEN
OD_IOP_MMHG: 23
OS_IOP_MMHG: 13

## 2025-07-07 ASSESSMENT — VISUAL ACUITY
OS_SC: 20/20-1
OD_PH_SC: 20/30+1
OD_SC: 20/70+2
METHOD: SNELLEN - LINEAR

## 2025-07-07 ASSESSMENT — SLIT LAMP EXAM - LIDS
COMMENTS: NORMAL
COMMENTS: NORMAL

## 2025-07-07 ASSESSMENT — ENCOUNTER SYMPTOMS: EYES NEGATIVE: 1

## 2025-07-07 ASSESSMENT — CONF VISUAL FIELD
OD_INFERIOR_NASAL_RESTRICTION: 0
OS_INFERIOR_NASAL_RESTRICTION: 0
OS_SUPERIOR_TEMPORAL_RESTRICTION: 0
OS_SUPERIOR_NASAL_RESTRICTION: 0
OS_NORMAL: 1
OS_INFERIOR_TEMPORAL_RESTRICTION: 0
OD_SUPERIOR_TEMPORAL_RESTRICTION: 0
OD_SUPERIOR_NASAL_RESTRICTION: 0
OD_NORMAL: 1
OD_INFERIOR_TEMPORAL_RESTRICTION: 0

## 2025-07-07 ASSESSMENT — EXTERNAL EXAM - RIGHT EYE: OD_EXAM: NORMAL

## 2025-07-07 ASSESSMENT — EXTERNAL EXAM - LEFT EYE: OS_EXAM: NORMAL

## 2025-07-07 NOTE — PROGRESS NOTES
Assessment/Plan   Diagnoses and all orders for this visit:  Status post penetrating keratoplasty    POM#4 post penetrating keratoplasty (PK) OD (3/18/25)  Clear cornea, no edema, Quiet anterior chamber (AC) stil pn pred 6 times daily.     She reported binocular double vision. Explained that this issue is caused by eye misalignment (Right exo), now she can see out of her right eye compared to HM vision pre-op.    Double vision is better (almost fully resolved) per patient, eyes straight In primary position. Intraocular pressure (IOP) 23 will observe for now.     Plan:  Decrease Pred to QID for 2 weeks then TID until next visit  Return precautions   See in 6-8 weeks.

## 2025-07-21 ENCOUNTER — OFFICE VISIT (OUTPATIENT)
Dept: URGENT CARE | Age: 40
End: 2025-07-21
Payer: COMMERCIAL

## 2025-07-21 VITALS
OXYGEN SATURATION: 98 % | DIASTOLIC BLOOD PRESSURE: 69 MMHG | TEMPERATURE: 98.9 F | RESPIRATION RATE: 18 BRPM | HEART RATE: 115 BPM | SYSTOLIC BLOOD PRESSURE: 102 MMHG | BODY MASS INDEX: 22.63 KG/M2 | WEIGHT: 136 LBS

## 2025-07-21 DIAGNOSIS — Z11.3 SCREENING EXAMINATION FOR STI: ICD-10-CM

## 2025-07-21 DIAGNOSIS — R10.31 RIGHT LOWER QUADRANT ABDOMINAL PAIN: Primary | ICD-10-CM

## 2025-07-21 DIAGNOSIS — R61 NIGHT SWEATS: ICD-10-CM

## 2025-07-21 DIAGNOSIS — R31.9 HEMATURIA, UNSPECIFIED TYPE: ICD-10-CM

## 2025-07-21 LAB
POC APPEARANCE, URINE: ABNORMAL
POC BILIRUBIN, URINE: NEGATIVE
POC BLOOD, URINE: ABNORMAL
POC COLOR, URINE: YELLOW
POC GLUCOSE, URINE: NEGATIVE MG/DL
POC KETONES, URINE: NEGATIVE MG/DL
POC LEUKOCYTES, URINE: ABNORMAL
POC NITRITE,URINE: NEGATIVE
POC PH, URINE: 6 PH
POC PROTEIN, URINE: ABNORMAL MG/DL
POC SPECIFIC GRAVITY, URINE: 1.01
POC UROBILINOGEN, URINE: 0.2 EU/DL

## 2025-07-21 PROCEDURE — 99214 OFFICE O/P EST MOD 30 MIN: CPT | Performed by: FAMILY MEDICINE

## 2025-07-21 PROCEDURE — 81003 URINALYSIS AUTO W/O SCOPE: CPT | Performed by: FAMILY MEDICINE

## 2025-07-21 NOTE — PATIENT INSTRUCTIONS
Patient directed to [place] ED to be evaluated further with blood work and possible CT scan to rule out appendicitis, etc.

## 2025-07-21 NOTE — PROGRESS NOTES
Subjective   Patient ID: Azeb Amato is a 40 y.o. female. They present today with a chief complaint of Blood in Urine.    History of Present Illness  HPI  Days of blood noticed in urine.  No pain, urgency or urinary frequency reported.  No blood noted in  stool. No abdominal pains. No back pain. No vaginal discharge or rash. No nausea, vomiting or diarrhea. No known exposures to STDs. Denies fevers or chills. No medications taken yet for symptoms.      Past Medical History  Allergies as of 07/21/2025    (No Known Allergies)       Prescriptions Prior to Admission[1]     Medical History[2]    Surgical History[3]     reports that she quit smoking about 10 years ago. Her smoking use included cigarettes. She has never used smokeless tobacco. She reports that she does not drink alcohol and does not use drugs.    Review of Systems  Review of Systems     As in history of present illness                          Objective    Vitals:    07/21/25 1402   BP: 102/69   Pulse: (!) 115   Resp: 18   Temp: 37.2 °C (98.9 °F)   SpO2: 98%   Weight: 61.7 kg (136 lb)     No LMP recorded. (Menstrual status: IUD).    Physical Exam  Alert and oriented, no apparent distress  Abdomen soft, with tenderness over McBurney's point and right lower quadrant, some guarding, nondistended.  No CVA tenderness  Skin shows no rashes sores or lesions   exam not indicated at this time  Procedures    Point of Care Test & Imaging Results from this visit  Results for orders placed or performed in visit on 07/21/25   POCT UA Automated manually resulted   Result Value Ref Range    POC Color, Urine Yellow Straw, Yellow, Light-Yellow    POC Appearance, Urine Hazy (A) Clear    POC Glucose, Urine NEGATIVE NEGATIVE mg/dl    POC Bilirubin, Urine NEGATIVE NEGATIVE    POC Ketones, Urine NEGATIVE NEGATIVE mg/dl    POC Specific Gravity, Urine 1.015 1.005 - 1.035    POC Blood, Urine LARGE (3+) (A) NEGATIVE    POC PH, Urine 6.0 No Reference Range Established PH    POC  Protein, Urine TRACE (A) NEGATIVE mg/dl    POC Urobilinogen, Urine 0.2 0.2, 1.0 EU/DL    Poc Nitrite, Urine NEGATIVE NEGATIVE    POC Leukocytes, Urine TRACE (A) NEGATIVE      Imaging  No results found.    Cardiology, Vascular, and Other Imaging  No other imaging results found for the past 2 days      Diagnostic study results (if any) were reviewed by Phan Valderrama MD.    Assessment/Plan   Allergies, medications, history, and pertinent labs/EKGs/Imaging reviewed by Phan Valderrama MD.     Medical Decision Making  Because of the right lower quadrant abdominal pain and tenderness combined with night sweats which may or may not represent fevers, I feel further evaluation in the ED is warranted as blood work and CT scan would be necessary to rule out appendicitis as well as other etiologies.  No treatment given at this time although cultures have been sent.  Patient will drive self to [place] ED for further evaluation    Orders and Diagnoses  Diagnoses and all orders for this visit:  Right lower quadrant abdominal pain  Hematuria, unspecified type  -     POCT UA Automated manually resulted  -     Urine Culture  Night sweats  -     Urine Culture  Screening examination for STI  -     C. trachomatis / N. gonorrhoeae, Amplified, Urogenital  -     Trichomonas vaginalis, Amplified      Medical Admin Record      Patient disposition: ED    Electronically signed by Phan Valderrama MD  2:35 PM           [1] (Not in a hospital admission)   [2]   Past Medical History:  Diagnosis Date    Anemia     Anxiety     Bipolar 1 disorder (Multi)     Cancer (Multi)     Depression     GERD (gastroesophageal reflux disease)     Meningitis ([facility])     Myasthenia gravis     Osteoporosis     Panic attacks     PONV (postoperative nausea and vomiting)     Right corneal scar with opacity     Substance abuse     Urinary tract infection     Varicella     Visual impairment    [3]   Past Surgical History:  Procedure Laterality Date    ADENOIDECTOMY       "CERVICAL CONIZATION   W/ LASER       SECTION, LOW TRANSVERSE      EXPLORATORY LAPAROTOMY      looked at ovaries, \"found nothing\"    PENETRATING KERATOPLASTY Right 2025    Dr. Zaidi    TONSILLECTOMY      TOTAL THYMECTOMY       "

## 2025-07-22 ENCOUNTER — TELEPHONE (OUTPATIENT)
Dept: URGENT CARE | Age: 40
End: 2025-07-22

## 2025-07-23 LAB
BACTERIA UR CULT: ABNORMAL
C TRACH RRNA SPEC QL NAA+PROBE: NOT DETECTED
N GONORRHOEA RRNA SPEC QL NAA+PROBE: NOT DETECTED
QUEST GC CT AMPLIFIED (ALWAYS MESSAGE): NORMAL
T VAGINALIS RRNA SPEC QL NAA+PROBE: NOT DETECTED

## 2025-07-29 ENCOUNTER — TELEPHONE (OUTPATIENT)
Dept: PRIMARY CARE | Facility: CLINIC | Age: 40
End: 2025-07-29
Payer: COMMERCIAL

## 2025-07-29 ENCOUNTER — PATIENT OUTREACH (OUTPATIENT)
Dept: PRIMARY CARE | Facility: CLINIC | Age: 40
End: 2025-07-29
Payer: COMMERCIAL

## 2025-07-29 RX ORDER — ACETAMINOPHEN 500 MG
500 TABLET ORAL EVERY 6 HOURS PRN
COMMUNITY
Start: 2025-07-27 | End: 2025-08-06

## 2025-07-29 NOTE — PROGRESS NOTES
Discharge Facility: [facility]  Discharge Diagnosis: Pelvic abscess, diverticulitis with sepsis   Admission Date: 7/21/25  Discharge Date: 7/28/25    PCP Appointment Date: 8/5/25  Specialist Appointment Date:   - Urology  - Infectious Disease  - General Surgery  - Gastroenterology  Hospital Encounter and Summary Linked: Yes  Hospital Encounter   See discharge assessment below for further details: N/A    Wrap Up  Wrap Up Additional Comments: Pt states she is not feeling that great but is on the mend. Reviewed medication changes.  Reports home care was out with her this morning for her infusion. Has 11yo daughter at home who has been helping with care.  Confirms follow up with PCP. Denies needs at this time. Aware to return to ED for new/worsening symptoms. Denies questions/concerns at this time. (7/29/2025  3:56 PM)    Medications  Medications reviewed with patient/caregiver?: Yes (new/changes only) (7/29/2025  3:56 PM)  Is the patient having any side effects they believe may be caused by any medication additions or changes?: No (7/29/2025  3:56 PM)  Does the patient have all medications ordered at discharge?: Yes (7/29/2025  3:56 PM)  Care Management Interventions: No intervention needed (7/29/2025  3:56 PM)  Prescription Comments: START: Align 10.5mg BID, acetaminopehn 500mg q6h PRN, zosyn (7/29/2025  3:56 PM)  Is the patient taking all medications as directed (includes completed medication regime)?: Yes (7/29/2025  3:56 PM)  Care Management Interventions: Provided patient education (7/29/2025  3:56 PM)    Appointments  Does the patient have a primary care provider?: Yes (7/29/2025  3:56 PM)  Care Management Interventions: Verified appointment date/time/provider (7/29/2025  3:56 PM)  Has the patient kept scheduled appointments due by today?: Yes (7/29/2025  3:56 PM)  Care Management Interventions: Advised patient to keep appointment (7/29/2025  3:56 PM)    Self Management  What is the home health agency?: [facility] Phone: [phone] (7/29/2025  3:56 PM)  Has home health visited the patient within 72 hours of discharge?: Yes (7/29/2025  3:56 PM)  What Durable Medical Equipment (DME) was ordered?: n/a (7/29/2025  3:56 PM)    Patient Teaching  Does the patient have access to their discharge instructions?: Yes (7/29/2025  3:56 PM)  Care Management Interventions: Reviewed instructions with patient (7/29/2025  3:56 PM)  What is the patient's perception of their health status since discharge?: Improving (7/29/2025  3:56 PM)  Is the patient/caregiver able to teach back the hierarchy of who to call/visit for symptoms/problems? PCP, Specialist, Home Health nurse, Urgent Care, ED, 911: Yes (7/29/2025  3:56 PM)

## 2025-07-29 NOTE — TELEPHONE ENCOUNTER
Jessica with [facility] calling to see if you will follow patient for care. She was released from the hospital following sepsis and is on antibiotics for the next month.

## 2025-07-30 NOTE — TELEPHONE ENCOUNTER
Patient had to call her OB about the blood in her urine. She is currently on Piperacillin x3 weeks 10 ml/hr. And labs will done every week.

## 2025-08-05 ENCOUNTER — APPOINTMENT (OUTPATIENT)
Dept: PRIMARY CARE | Facility: CLINIC | Age: 40
End: 2025-08-05
Payer: COMMERCIAL

## 2025-08-05 VITALS
DIASTOLIC BLOOD PRESSURE: 62 MMHG | HEIGHT: 65 IN | WEIGHT: 133.6 LBS | HEART RATE: 78 BPM | SYSTOLIC BLOOD PRESSURE: 102 MMHG | RESPIRATION RATE: 16 BRPM | BODY MASS INDEX: 22.26 KG/M2

## 2025-08-05 DIAGNOSIS — Z09 HOSPITAL DISCHARGE FOLLOW-UP: ICD-10-CM

## 2025-08-05 DIAGNOSIS — R11.2 NAUSEA AND VOMITING, UNSPECIFIED VOMITING TYPE: ICD-10-CM

## 2025-08-05 DIAGNOSIS — Z00.00 ENCOUNTER FOR PREVENTIVE HEALTH EXAMINATION: ICD-10-CM

## 2025-08-05 DIAGNOSIS — E78.5 ELEVATED LIPIDS: Primary | ICD-10-CM

## 2025-08-05 DIAGNOSIS — K57.20 DIVERTICULITIS OF LARGE INTESTINE WITH ABSCESS WITHOUT BLEEDING: ICD-10-CM

## 2025-08-05 PROCEDURE — 99495 TRANSJ CARE MGMT MOD F2F 14D: CPT | Performed by: INTERNAL MEDICINE

## 2025-08-05 PROCEDURE — 3008F BODY MASS INDEX DOCD: CPT | Performed by: INTERNAL MEDICINE

## 2025-08-05 RX ORDER — ONDANSETRON 4 MG/1
4 TABLET, ORALLY DISINTEGRATING ORAL EVERY 8 HOURS PRN
Qty: 30 TABLET | Refills: 2 | Status: SHIPPED | OUTPATIENT
Start: 2025-08-05

## 2025-08-05 ASSESSMENT — PAIN SCALES - GENERAL: PAINLEVEL_OUTOF10: 0-NO PAIN

## 2025-08-05 ASSESSMENT — PATIENT HEALTH QUESTIONNAIRE - PHQ9
1. LITTLE INTEREST OR PLEASURE IN DOING THINGS: NOT AT ALL
2. FEELING DOWN, DEPRESSED OR HOPELESS: NOT AT ALL
SUM OF ALL RESPONSES TO PHQ9 QUESTIONS 1 AND 2: 0

## 2025-08-05 ASSESSMENT — ENCOUNTER SYMPTOMS
NAUSEA: 1
DIARRHEA: 0

## 2025-08-05 NOTE — PATIENT INSTRUCTIONS
I sent over the zofran ( ondansetron ) for nausea.     I am glad you are doing better.     See me again in January or February for annual physical.     Get blood test done after fasting close to that visit for cholesterol and chemistry.

## 2025-08-05 NOTE — ASSESSMENT & PLAN NOTE
Hospitalized 07/21-28 and found to have diverticulitis with an abscess that was drained.   She is doing well since being home. Continues to have some abdominal pain, but advised this may take a little bit of time before being completely resolved.   She is going to see GI for an endoscopy and colonoscopy  Now on outpatient IV antibiotics via PICC line.

## 2025-08-05 NOTE — ASSESSMENT & PLAN NOTE
Has been experiencing nausea in the morning due to IV antibiotics   Refill sent for zofran 4 mg every 8 hours PRN

## 2025-08-05 NOTE — PROGRESS NOTES
"Patient: Azeb Amato   : 1985  PCP: Tashia Nobles DO  MRN: [id number]       Azeb Amato  is a 40 y.o. female presenting today for follow-up after being discharged from the hospital 8 days ago. The main problem requiring admission was Pelvic abscess, diverticulitis with sepsis. The discharge summary and/or Transitional Care Management documentation was reviewed. Medication reconciliation was reviewed.     Azeb Amato was contacted by Transitional Care Management services two days after  discharge. This encounter and supporting documentation was reviewed.    Significant events in the hospital stay for Azeb Amato   Patient presents today for a hospital follow-up. She was admitted to the ED - for diverticulitis with sepsis and pelvic abscess. She began having abdominal pain when she was visiting [facility]; she said she endured this through her return flight, and when she arrived in [place] she went straight to the hospital. She continues to have some abdominal pain but advised this may take time to completely resolve. She is finding relief with heating pad. She is going to follow-up with GI and they are planning to do an endoscopy and colonoscopy. She is concerned about her recent platelet results- advised that this is abnormal as a result of her recent sepsis and illness and should resolve.   She is on IV antibiotic therapy via PICC line. Some nausea in the morning from antibiotics.     Albumin was on the lower side, advised to increase her protein intake as well.     Denies diarrhea and constipation.     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     Review of Systems   Gastrointestinal:  Positive for nausea. Negative for diarrhea.             The complexity of medical decision making for this patient's transitional care is high.      Visit Vitals  /62   Pulse 78   Resp 16   Ht 1.651 m (5' 5\")   Wt 60.6 kg (133 lb 9.6 oz)   BMI 22.23 kg/m²   OB Status IUD   Smoking Status Former   BSA " 1.67 m²      Physical Exam  Constitutional:       Appearance: Normal appearance.   HENT:      Mouth/Throat:      Mouth: Mucous membranes are moist.      Pharynx: Oropharynx is clear.     Eyes:      Conjunctiva/sclera: Conjunctivae normal.      Pupils: Pupils are equal, round, and reactive to light.       Cardiovascular:      Rate and Rhythm: Normal rate and regular rhythm.      Heart sounds: Normal heart sounds.   Pulmonary:      Effort: Pulmonary effort is normal.      Breath sounds: Normal breath sounds.   Abdominal:      General: Bowel sounds are normal. There is no distension.      Palpations: Abdomen is soft. There is no mass.      Tenderness: There is no abdominal tenderness.   Lymphadenopathy:      Cervical: No cervical adenopathy.     Skin:     General: Skin is warm and dry.     Neurological:      General: No focal deficit present.           Assessment/Plan   Problem List Items Addressed This Visit       Hospital follow up within 8 days of discharge.   This is also her face to face visit for home health care.   Nausea and vomiting    Has been experiencing nausea in the morning due to IV antibiotics   Refill sent for zofran 4 mg every 8 hours PRN          Relevant Medications    ondansetron ODT (Zofran-ODT) 4 mg disintegrating tablet    Elevated lipids - Primary    Will continue to monitor.  Labs ordered today.            Relevant Orders    Lipid Panel    Comprehensive Metabolic Panel    Diverticulitis of large intestine with abscess without bleeding    Hospitalized 07/21-28 and found to have diverticulitis with an abscess that was drained.   She is doing well since being home. Continues to have some abdominal pain, but advised this may take a little bit of time before being completely resolved.   She is going to see GI for an endoscopy and colonoscopy  Now on outpatient IV antibiotics via PICC line.          Encounter for preventive health examination    Relevant Orders    Follow Up In [facility]  - PCP - Health Maintenance       Please return to see me in 6 months for annual physical    Scribe Attestation  By signing my name below, I, Georgia Ibarra   attest that this documentation has been prepared under the direction and in the presence of Tashia Nobles DO.

## 2025-08-06 ENCOUNTER — TELEPHONE (OUTPATIENT)
Dept: PRIMARY CARE | Facility: CLINIC | Age: 40
End: 2025-08-06
Payer: COMMERCIAL

## 2025-08-06 NOTE — TELEPHONE ENCOUNTER
Pt is asking for a letter for work in regards to restrictions with her picc line for the next 28 days.

## 2025-08-08 ENCOUNTER — PATIENT MESSAGE (OUTPATIENT)
Dept: PRIMARY CARE | Facility: CLINIC | Age: 40
End: 2025-08-08
Payer: COMMERCIAL

## 2025-08-08 ENCOUNTER — PATIENT OUTREACH (OUTPATIENT)
Dept: PRIMARY CARE | Facility: CLINIC | Age: 40
End: 2025-08-08
Payer: COMMERCIAL

## 2025-08-08 ENCOUNTER — TELEPHONE (OUTPATIENT)
Dept: PRIMARY CARE | Facility: CLINIC | Age: 40
End: 2025-08-08
Payer: COMMERCIAL

## 2025-08-08 ENCOUNTER — OFFICE VISIT (OUTPATIENT)
Dept: URGENT CARE | Age: 40
End: 2025-08-08
Payer: COMMERCIAL

## 2025-08-08 VITALS
WEIGHT: 133 LBS | SYSTOLIC BLOOD PRESSURE: 130 MMHG | DIASTOLIC BLOOD PRESSURE: 82 MMHG | RESPIRATION RATE: 16 BRPM | HEART RATE: 67 BPM | BODY MASS INDEX: 22.13 KG/M2 | OXYGEN SATURATION: 99 % | TEMPERATURE: 97.7 F

## 2025-08-08 DIAGNOSIS — R10.31 RIGHT LOWER QUADRANT ABDOMINAL PAIN: Primary | ICD-10-CM

## 2025-08-08 DIAGNOSIS — Z87.19 H/O DIVERTICULITIS OF COLON: ICD-10-CM

## 2025-08-08 DIAGNOSIS — R53.83 FATIGUE, UNSPECIFIED TYPE: ICD-10-CM

## 2025-08-08 ASSESSMENT — ENCOUNTER SYMPTOMS
DIARRHEA: 0
UNEXPECTED WEIGHT CHANGE: 0
BRUISES/BLEEDS EASILY: 0
FEVER: 0
FATIGUE: 1
EYES NEGATIVE: 1
BLOOD IN STOOL: 0
CARDIOVASCULAR NEGATIVE: 1
DIAPHORESIS: 0
ANAL BLEEDING: 0
CONSTIPATION: 0
MUSCULOSKELETAL NEGATIVE: 1
RECTAL PAIN: 0
RESPIRATORY NEGATIVE: 1
NAUSEA: 0
ACTIVITY CHANGE: 1
PSYCHIATRIC NEGATIVE: 1
ABDOMINAL PAIN: 1
VOMITING: 0
NEUROLOGICAL NEGATIVE: 1
COLOR CHANGE: 0
APPETITE CHANGE: 1
ABDOMINAL DISTENTION: 0
CHILLS: 0
WOUND: 0

## 2025-08-08 NOTE — TELEPHONE ENCOUNTER
I called Azeb to get a fax number for her work to fax a letter.    She stated that she is feeling exhausted and went to urgent care for a B12 shot. They felt her abdomen while there and discovered some tenderness. They suggested she to by EMS to the hospital but she had a friend take her to [facility]. She is there currently.

## 2025-08-08 NOTE — TELEPHONE ENCOUNTER
I spoke with Azeb to get a fax number and she stated I would have to email it to [email or web address]. Her work does not have a fax number. I will have Sherine mail the letter since we can not email.

## 2025-08-08 NOTE — LETTER
August 8, 2025     Patient: Azeb Amato   YOB: 1985   Date of Visit: 8/8/2025       To Whom It May Concern:    Azeb Amato was seen in my clinic on 8/8/2025 at 2:10 pm. Please excuse Azeb for her absence from work on this day to make the appointment.    If you have any questions or concerns, please don't hesitate to call.         Sincerely,         Nitesh Alcantar, APRN-CNP        CC: No Recipients

## 2025-08-08 NOTE — PROGRESS NOTES
Subjective   Patient ID: Azeb Amato is a 40 y.o. female. They present today with a chief complaint of Illness.    History of Present Illness  Recent extensive h/o diverticulitis with abscess, non-bleeding with admission approx 18 days ago. Seen again on 9 days later.   Home with PICC and continuous zosyn now.  C/o ongoing lower abdominal pain and increased lethargy. Has tried OTC meds with mild relief. Denies any CP, SOB, HA, fever, abdominal pain, and nausea/vomiting otherwise.      History provided by:  Patient      Past Medical History  Allergies as of 08/08/2025    (No Known Allergies)       Prescriptions Prior to Admission[1]     Medical History[2]    Surgical History[3]     reports that she quit smoking about 10 years ago. Her smoking use included cigarettes. She has never used smokeless tobacco. She reports that she does not drink alcohol and does not use drugs.    Review of Systems  Review of Systems   Constitutional:  Positive for activity change, appetite change and fatigue. Negative for chills, diaphoresis, fever and unexpected weight change.   HENT: Negative.     Eyes: Negative.    Respiratory: Negative.     Cardiovascular: Negative.    Gastrointestinal:  Positive for abdominal pain. Negative for abdominal distention, anal bleeding, blood in stool, constipation, diarrhea, nausea, rectal pain and vomiting.   Genitourinary: Negative.    Musculoskeletal: Negative.    Skin: Negative.  Negative for color change, pallor, rash and wound.   Neurological: Negative.    Hematological:  Does not bruise/bleed easily.   Psychiatric/Behavioral: Negative.     All other systems reviewed and are negative.                                 Objective    Vitals:    08/08/25 1424   BP: 130/82   Pulse: 67   Resp: 16   Temp: 36.5 °C (97.7 °F)   SpO2: 99%   Weight: 60.3 kg (133 lb)     No LMP recorded. (Menstrual status: IUD).    Physical Exam  Vitals and nursing note reviewed.   Constitutional:       General: She is not in  acute distress.     Appearance: Normal appearance. She is normal weight. She is not ill-appearing, toxic-appearing or diaphoretic.   HENT:      Head: Normocephalic and atraumatic.      Nose: Nose normal.      Mouth/Throat:      Mouth: Mucous membranes are moist.      Pharynx: Oropharynx is clear.     Eyes:      Extraocular Movements: Extraocular movements intact.      Pupils: Pupils are equal, round, and reactive to light.       Cardiovascular:      Rate and Rhythm: Normal rate and regular rhythm.      Pulses: Normal pulses.      Heart sounds: Normal heart sounds.   Pulmonary:      Effort: Pulmonary effort is normal.      Breath sounds: Normal breath sounds.   Abdominal:      General: Abdomen is flat. Bowel sounds are normal. There is no distension.      Palpations: Abdomen is soft. There is no mass.      Tenderness: There is abdominal tenderness. There is guarding. There is no right CVA tenderness, left CVA tenderness or rebound.      Hernia: No hernia is present.     Skin:     General: Skin is warm and dry.     Neurological:      General: No focal deficit present.      Mental Status: She is alert and oriented to person, place, and time.     Psychiatric:         Mood and Affect: Mood normal.         Behavior: Behavior normal.         Procedures    Point of Care Test & Imaging Results from this visit  No results found for this visit on 08/08/25.   Imaging  No results found.    Cardiology, Vascular, and Other Imaging  No other imaging results found for the past 2 days      Diagnostic study results (if any) were reviewed by PRATIMA Turner.    Assessment/Plan   Allergies, medications, history, and pertinent labs/EKGs/Imaging reviewed by PRATIMA Turner.     Medical Decision Making  D/t recent hx, recommending higher level of care/eval in the ED. She is agreeable and will go to [facility] ED. Signed AMA to self transport by private care with friend. Patient verbalized understanding and agreed with  "the plan of care.  Discussed with Supervising Physician, Dr. Valderrama, and agrees with the plan of care.    Orders and Diagnoses  Diagnoses and all orders for this visit:  Right lower quadrant abdominal pain  Fatigue, unspecified type  H/O diverticulitis of colon      Medical Admin Record      Patient disposition: ED. Transported by: Private Vehicle.    Electronically signed by SOPHIE Turner-CNP  3:52 PM       [1] (Not in a hospital admission)   [2]   Past Medical History:  Diagnosis Date    Anemia     Anxiety     Bipolar 1 disorder (Multi)     Cancer (Multi)     Depression     GERD (gastroesophageal reflux disease)     Meningitis ([facility])     Myasthenia gravis     Osteoporosis     Panic attacks     PONV (postoperative nausea and vomiting)     Right corneal scar with opacity     Substance abuse     Urinary tract infection     Varicella     Visual impairment    [3]   Past Surgical History:  Procedure Laterality Date    ADENOIDECTOMY      CERVICAL CONIZATION   W/ LASER       SECTION, LOW TRANSVERSE      EXPLORATORY LAPAROTOMY      looked at ovaries, \"found nothing\"    PENETRATING KERATOPLASTY Right 2025    Dr. Zaidi    TONSILLECTOMY      TOTAL THYMECTOMY       "

## 2025-08-08 NOTE — PROGRESS NOTES
Confirmation of at least 2 patient identifiers.    Completed telephonic follow-up with patient after recent visit with PCP    Spoke to patient during outreach call.    Patient reports feeling: Improved  Patient reports she is a little better and stronger each day.      Patient has questions or concerns about medications: No    Have all prescribed medications been filled? Yes    Patient has necessary resources to manage their care? Yes    Patient has questions or concerns? No    Next care management follow-up approximately within one month. Patient would prefer [employer] message instead of phone calls.   Care  information provided to patient.        
58.18

## 2025-08-12 ENCOUNTER — PATIENT MESSAGE (OUTPATIENT)
Dept: PRIMARY CARE | Facility: CLINIC | Age: 40
End: 2025-08-12
Payer: COMMERCIAL

## 2025-08-13 ENCOUNTER — TELEPHONE (OUTPATIENT)
Dept: PRIMARY CARE | Facility: CLINIC | Age: 40
End: 2025-08-13
Payer: COMMERCIAL

## 2025-08-25 ENCOUNTER — TELEPHONE (OUTPATIENT)
Dept: PRIMARY CARE | Facility: CLINIC | Age: 40
End: 2025-08-25

## 2025-08-25 ENCOUNTER — APPOINTMENT (OUTPATIENT)
Dept: OPHTHALMOLOGY | Age: 40
End: 2025-08-25
Payer: COMMERCIAL

## 2025-08-25 DIAGNOSIS — Z94.7 STATUS POST PENETRATING KERATOPLASTY: Primary | ICD-10-CM

## 2025-08-25 DIAGNOSIS — B00.52 HSV STROMAL KERATITIS: ICD-10-CM

## 2025-08-25 PROCEDURE — 99213 OFFICE O/P EST LOW 20 MIN: CPT | Performed by: OPHTHALMOLOGY

## 2025-08-25 ASSESSMENT — TONOMETRY
IOP_METHOD: GOLDMANN APPLANATION
IOP_METHOD: TONOPEN
OD_IOP_MMHG: 25
OS_IOP_MMHG: 16
OD_IOP_MMHG: 21

## 2025-08-25 ASSESSMENT — SLIT LAMP EXAM - LIDS
COMMENTS: NORMAL
COMMENTS: NORMAL

## 2025-08-25 ASSESSMENT — VISUAL ACUITY
METHOD: SNELLEN - LINEAR
OS_SC: 20/20
OD_SC: 20/50-2

## 2025-08-25 ASSESSMENT — ENCOUNTER SYMPTOMS: EYES NEGATIVE: 1

## 2025-08-25 ASSESSMENT — EXTERNAL EXAM - RIGHT EYE: OD_EXAM: NORMAL

## 2025-08-25 ASSESSMENT — EXTERNAL EXAM - LEFT EYE: OS_EXAM: NORMAL

## 2025-08-28 ENCOUNTER — ON CAMPUS - OUTPATIENT (OUTPATIENT)
Dept: URBAN - METROPOLITAN AREA HOSPITAL 51 | Facility: HOSPITAL | Age: 40
End: 2025-08-28
Payer: COMMERCIAL

## 2025-08-28 DIAGNOSIS — K59.09 OTHER CONSTIPATION: ICD-10-CM

## 2025-08-28 DIAGNOSIS — D50.9 IRON DEFICIENCY ANEMIA, UNSPECIFIED: ICD-10-CM

## 2025-08-28 DIAGNOSIS — K57.20 DIVERTICULITIS OF LARGE INTESTINE WITH PERFORATION AND ABSCE: ICD-10-CM

## 2025-08-28 DIAGNOSIS — G70.00 MYASTHENIA GRAVIS WITHOUT (ACUTE) EXACERBATION: ICD-10-CM

## 2025-08-28 PROCEDURE — 99214 OFFICE O/P EST MOD 30 MIN: CPT | Performed by: INTERNAL MEDICINE

## 2025-09-02 ENCOUNTER — PATIENT OUTREACH (OUTPATIENT)
Dept: PRIMARY CARE | Facility: CLINIC | Age: 40
End: 2025-09-02
Payer: COMMERCIAL

## 2025-09-02 RX ORDER — AMOXICILLIN AND CLAVULANATE POTASSIUM 875; 125 MG/1; MG/1
875 TABLET, FILM COATED ORAL 2 TIMES DAILY
COMMUNITY
Start: 2025-08-29 | End: 2025-09-10

## 2025-09-09 ENCOUNTER — APPOINTMENT (OUTPATIENT)
Dept: PRIMARY CARE | Facility: CLINIC | Age: 40
End: 2025-09-09
Payer: COMMERCIAL

## 2025-10-23 ENCOUNTER — APPOINTMENT (OUTPATIENT)
Dept: OPHTHALMOLOGY | Age: 40
End: 2025-10-23
Payer: COMMERCIAL

## 2025-11-21 ENCOUNTER — APPOINTMENT (OUTPATIENT)
Dept: PRIMARY CARE | Facility: CLINIC | Age: 40
End: 2025-11-21
Payer: COMMERCIAL

## 2025-11-24 ENCOUNTER — APPOINTMENT (OUTPATIENT)
Dept: OPHTHALMOLOGY | Age: 40
End: 2025-11-24
Payer: COMMERCIAL

## 2026-02-20 ENCOUNTER — APPOINTMENT (OUTPATIENT)
Dept: PRIMARY CARE | Facility: CLINIC | Age: 41
End: 2026-02-20
Payer: COMMERCIAL

## (undated) DEVICE — Device

## (undated) DEVICE — NEEDLE, RETROBULBAR, 23G X 8MM

## (undated) DEVICE — GLOVE, SURGICAL, PROTEXIS PI , 7.5, PF, LF

## (undated) DEVICE — SUTURE, NYLON, 10-0, 12 IN, CU5, DA, BLACK